# Patient Record
Sex: MALE | Race: WHITE | Employment: OTHER | URBAN - METROPOLITAN AREA
[De-identification: names, ages, dates, MRNs, and addresses within clinical notes are randomized per-mention and may not be internally consistent; named-entity substitution may affect disease eponyms.]

---

## 2017-11-01 ENCOUNTER — HOSPITAL ENCOUNTER (OUTPATIENT)
Dept: OTHER | Age: 82
Discharge: HOME | End: 2017-11-30
Attending: INTERNAL MEDICINE | Admitting: INTERNAL MEDICINE

## 2017-12-01 ENCOUNTER — HOSPITAL ENCOUNTER (OUTPATIENT)
Dept: OTHER | Age: 82
Discharge: HOME | End: 2017-12-31
Attending: INTERNAL MEDICINE | Admitting: INTERNAL MEDICINE

## 2018-01-01 ENCOUNTER — HOSPITAL ENCOUNTER (OUTPATIENT)
Dept: OTHER | Age: 83
Discharge: HOME | End: 2018-01-31
Attending: INTERNAL MEDICINE | Admitting: INTERNAL MEDICINE

## 2018-02-01 ENCOUNTER — HOSPITAL ENCOUNTER (OUTPATIENT)
Dept: OTHER | Age: 83
Discharge: HOME | End: 2018-02-28
Attending: INTERNAL MEDICINE | Admitting: INTERNAL MEDICINE

## 2018-03-01 ENCOUNTER — HOSPITAL ENCOUNTER (OUTPATIENT)
Dept: OTHER | Age: 83
Discharge: HOME | End: 2018-03-31
Attending: INTERNAL MEDICINE | Admitting: INTERNAL MEDICINE

## 2018-03-16 ENCOUNTER — HOSPITAL ENCOUNTER (OUTPATIENT)
Dept: CT IMAGING | Age: 83
Discharge: HOME | End: 2018-03-16
Attending: INTERNAL MEDICINE | Admitting: INTERNAL MEDICINE

## 2018-03-16 VITALS
WEIGHT: 162.26 LBS | RESPIRATION RATE: 16 BRPM | BODY MASS INDEX: 25.47 KG/M2 | OXYGEN SATURATION: 95 % | TEMPERATURE: 97.5 F | DIASTOLIC BLOOD PRESSURE: 63 MMHG | HEIGHT: 67 IN | HEART RATE: 85 BPM | SYSTOLIC BLOOD PRESSURE: 130 MMHG

## 2018-03-16 DIAGNOSIS — M35.9 NEUTROPENIA ASSOCIATED WITH AUTOIMMUNE DISEASE (HCC): ICD-10-CM

## 2018-03-16 DIAGNOSIS — D69.6 THROMBOCYTOPENIC DISORDER (HCC): ICD-10-CM

## 2018-03-16 DIAGNOSIS — D70.4 NEUTROPENIA ASSOCIATED WITH AUTOIMMUNE DISEASE (HCC): ICD-10-CM

## 2018-03-16 DIAGNOSIS — C91.Z0 OTHER LYMPHOID LEUKEMIA NOT HAVING ACHIEVED REMISSION (HCC): ICD-10-CM

## 2018-03-16 DIAGNOSIS — C91.Z0 LARGE GRANULAR LYMPHOCYTE DISORDER (HCC): ICD-10-CM

## 2018-03-16 LAB
IMMATURE RETIC FRACT: 0.53 (ref 0.21–0.37)
INR BLD: 1.05 (ref 0.85–1.15)
PROTHROMBIN TIME: 11.9 SEC (ref 9.6–13)
RETICULOCYTE ABSOLUTE COUNT: 0.11 M/UL
RETICULOCYTE COUNT PCT: 2.35 % (ref 0.5–2.18)

## 2018-03-16 RX ORDER — ACETAMINOPHEN 325 MG/1
650 TABLET ORAL EVERY 4 HOURS PRN
Status: DISCONTINUED | OUTPATIENT
Start: 2018-03-16 | End: 2018-03-17 | Stop reason: HOSPADM

## 2018-03-16 RX ORDER — MIDAZOLAM HYDROCHLORIDE 1 MG/ML
INJECTION INTRAMUSCULAR; INTRAVENOUS DAILY PRN
Status: COMPLETED | OUTPATIENT
Start: 2018-03-16 | End: 2018-03-16

## 2018-03-16 RX ORDER — FENTANYL CITRATE 50 UG/ML
INJECTION, SOLUTION INTRAMUSCULAR; INTRAVENOUS DAILY PRN
Status: COMPLETED | OUTPATIENT
Start: 2018-03-16 | End: 2018-03-16

## 2018-03-16 RX ADMIN — FENTANYL CITRATE 50 MCG: 50 INJECTION, SOLUTION INTRAMUSCULAR; INTRAVENOUS at 11:35

## 2018-03-16 RX ADMIN — MIDAZOLAM HYDROCHLORIDE 1 MG: 1 INJECTION INTRAMUSCULAR; INTRAVENOUS at 11:36

## 2018-03-16 ASSESSMENT — PAIN SCALES - GENERAL
PAINLEVEL_OUTOF10: 0

## 2018-03-16 ASSESSMENT — PAIN - FUNCTIONAL ASSESSMENT: PAIN_FUNCTIONAL_ASSESSMENT: 0-10

## 2018-03-16 NOTE — PROGRESS NOTES
Bx site low back without swelling or pain. Small sanquinous drainage on dressing.
pain level is established preoperatively using age appropriate pain scale.

## 2018-03-23 LAB
Lab: NORMAL
REPORT: NORMAL
THIS TEST SENT TO: NORMAL

## 2018-03-25 LAB
Lab: NORMAL
REPORT: NORMAL
THIS TEST SENT TO: NORMAL

## 2018-05-22 ENCOUNTER — HOSPITAL ENCOUNTER (OUTPATIENT)
Dept: OTHER | Age: 83
Discharge: HOME | End: 2018-05-31
Attending: INTERNAL MEDICINE | Admitting: INTERNAL MEDICINE

## 2018-05-29 ENCOUNTER — HOSPITAL ENCOUNTER (OUTPATIENT)
Dept: PHYSICAL THERAPY | Age: 83
Discharge: HOME | End: 2018-05-30
Admitting: INTERNAL MEDICINE

## 2018-05-29 NOTE — FLOWSHEET NOTE
plan (see comments)  [x] Plan of care initiated [] Hold pending MD visit [] Discharge    Plan for Next Session:  Add above as stated gradually as tolerated for overall strength and endurance and conditioning; progress to L2 Nustep and add leg press.       Electronically signed by:  Carloz Delgado [id number] [address]

## 2018-05-31 ENCOUNTER — HOSPITAL ENCOUNTER (OUTPATIENT)
Dept: PHYSICAL THERAPY | Age: 83
Discharge: HOME | End: 2018-06-30
Admitting: INTERNAL MEDICINE

## 2018-05-31 NOTE — FLOWSHEET NOTE
Resistance/Repetitions Other comments   Nustep seat 9 L2 x 5 min     Step HSS  GSS incline 3 x 20 sec each    Step tap (alternating)  Step up  6\" x 10  6\" x 10  R/L 1-2 finger touch    Gumdrop balance  SLB  Tandem balance 60 sec  30 sec R/L  30 sec R/L 1-2 finger touch   Fitter  F/B Thin x10 R/L    TB Row        Ext        tricep press  Red x15  Red x15  Red x15 5/31/18- Red TB issued for hep    Shoulder flex                 abd  Bicep curl 1# x10 B  1# x10 B  2# x10 B Inc reps   ll bars:  Reebok board  Bosu balance  Line walk   Lat walk with TB     x 30 sec each s/s     MH flex, abd  add   FAQ  HS curl 15# x 10 B  18# x 10 B     Leg press  40# x 10          UBE (last) X 4 min                      Other Therapeutic Activities:  Pt was educated on PT POC, Diagnosis, Prognosis, pathomechanics as well as frequency and duration of scheduling future physical therapy appointments. Time was also taken on this day to answer all patient questions and participation in PT. Reviewed appointment policy in detail with patient and patient verbalized understanding. Home Exercise Program:  Reviewed HEP from prior home PT:   Patient verbalized/demonstrated understanding and was issued written handout.     Timed Code Treatment Minutes:  40    Total Treatment Minutes: 40     Treatment/Activity Tolerance:  [x] Patient tolerated treatment well [] Patient limited by fatigue  [] Patient limited by pain  [] Patient limited by other medical complications  [x] Other: 5/31/18- issued hep for UE and TB     Prognosis: [x] Good [] Fair  [] Poor    Goals:    Short term goals  Time Frame for Short term goals: 2 wks  Short term goal 1: pt reports inc in endurance and strength by 25%   Short term goal 2: pt loreta 30 min of exercises without c/o       Long term goals  Time Frame for Long term goals : 4 wks  Long term goal 1: pt reports inc in endurance and strength by 50% for ease with social activities   Long term goal 2: B hip flex 5/5 for ease with amb loreta      Patient Requires Follow-up: [x] Yes  [] No    Plan:   [x] Continue per plan of care [] Alter current plan (see comments)  [] Plan of care initiated [] Hold pending MD visit [] Discharge    Plan for Next Session:  Add above as stated gradually as tolerated for overall strength and endurance and conditioning;  add MH.       Electronically signed by:  Toro Broderick, [id number] [address]

## 2018-06-01 ENCOUNTER — HOSPITAL ENCOUNTER (OUTPATIENT)
Dept: OTHER | Age: 83
Discharge: HOME | End: 2018-06-01
Attending: INTERNAL MEDICINE | Admitting: INTERNAL MEDICINE

## 2018-06-05 ENCOUNTER — HOSPITAL ENCOUNTER (OUTPATIENT)
Dept: PHYSICAL THERAPY | Age: 83
Discharge: HOME | End: 2018-06-06
Admitting: INTERNAL MEDICINE

## 2018-06-07 ENCOUNTER — HOSPITAL ENCOUNTER (OUTPATIENT)
Dept: PHYSICAL THERAPY | Age: 83
Discharge: HOME | End: 2018-06-08
Admitting: INTERNAL MEDICINE

## 2018-06-07 NOTE — PROGRESS NOTES
Outpatient Physical Therapy  [x] [facility]    Phone: [phone]   Fax: [phone]   [] [facility]  Phone: [phone]   Fax: [phone]  [] [facility]              Phone: [phone]   Fax: [phone]     Physical Therapy Progress/Discharge Note  Date: 2018        Patient Name:  Daisy Foster    :  1935  MRN: [id number]  Restrictions/Precautions: Position Activity Restriction  Other position/activity restrictions: low fall risk     Pertinent Medical History: Additional Pertinent Hx: leukemia; CAD; HTN; back surgeries; RA - occassional hip pain      Medical/Treatment Diagnosis Information:  · 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 Leeanna Melgoza of care signed (Y/N):   Y      Visit# / total visits:   - G code at 10   Pain level:      0/10      G-Code (if applicable):      Date G-Code Applied:          Time Period for Report:   18-18  Cancels/No-shows to date:  0    Plan of Care/Treatment to date:  [x] Therapeutic Exercise    [] Modalities:  [x] Therapeutic Activity     [] Ultrasound  [] Electrical Stimulation  [x] Gait Training      [] Cervical Traction    [] Lumbar Traction  [] Neuromuscular Re-education  [] Cold/hotpack [] Iontophoresis  [x] Instruction in HEP      Other:  [] Manual Therapy       [x]   Balance   [] Aquatic Therapy       []                    ?       Significant Findings At Last Visit/Comments:    Progress Note ( 18)  * overall 40% improvement noted per pt with endurance and activity level and strength overall  * pt loreta 45 min activity without c/o  * B hip flex 4+/5        Assessment:  Summary: pt tolerating progressing of strengthening exercises well   Patient's response to treatment:  good    Progress towards goals:  Progressing     Current Frequency/Duration:  # Days per week: [] 1 day # Weeks: [] 1 week [x] 4 weeks      [x] 2 days? [] 2 weeks [] 5 weeks      [] 3 days   [] 3 weeks [] 6 weeks     Rehab Potential: [] Excellent [x] Good [] Fair  [] Poor     Goal Status:  [] Achieved [x] Partially Achieved  [] Not Achieved     Patient Status: [] Continue per initial plan of Care     [] Patient now discharged     [x] Additional visits requested, Please re-certify for additional visits:      Requested frequency/duration: 2 X/week for 4weeks    Electronically signed by:  Morgan Jain, [id number]    If you have any questions or concerns, please don't hesitate to call.   Thank you for your referral.    Physician Signature:________________________________Date:__________________  By signing above, therapists plan is approved by physician

## 2018-06-12 ENCOUNTER — HOSPITAL ENCOUNTER (OUTPATIENT)
Dept: PHYSICAL THERAPY | Age: 83
Discharge: HOME | End: 2018-06-13
Admitting: INTERNAL MEDICINE

## 2018-06-14 ENCOUNTER — HOSPITAL ENCOUNTER (OUTPATIENT)
Dept: PHYSICAL THERAPY | Age: 83
Discharge: HOME | End: 2018-06-15
Admitting: INTERNAL MEDICINE

## 2018-06-14 NOTE — FLOWSHEET NOTE
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 Lexi Toro of care signed (Y/N):   Y     Visit# / total visits:   - G code at 10   Pain level: 0/10     G-Code (if applicable):      Date / Visit # G-Code Applied:  /  PT G-Codes  Functional Assessment Tool Used: LEFS  Score: 49/20-39%  Functional Limitation: Mobility: Walking and moving around  Mobility: Walking and Moving Around Current Status (): At least 20 percent but less than 40 percent impaired, limited or restricted  Mobility: Walking and Moving Around Goal Status (): At least 1 percent but less than 20 percent impaired, limited or restricted    Progress Note: [x]  Yes  []  No  Next due by: Visit #10      History of Injury: Subjective  Subjective: Pt has been dealing with low white blood cell count for years but the level dropped significantly in February. He was very lethargic and had no energy. His dx is large granular lymphocytic leukemia. It is not considered cancerous but he is currently taking a chemotherapy pill to see if that helps his numbers. He had home PT which has helped a lot and is ready to now move on and progress with strength and endurance and mobility. Subjective:  No pain now,still had a little mild soreness in low back at night and would still like to hold on leg press.   Pt also c/o mild R shoulder soreness at times. He saw oncologist and reports no significant change in white blood cell count numbers, so she stopped the oral chemotherapy x 2 months. She will reassess at that time and then he may receive some infusions from RA doctor if indicated to bring numbers up. Progress Note ( 6/7/18)  * overall 40% improvement noted per pt with endurance and activity level and strength overall  * pt loreta 45 min activity without c/o  * B hip flex 4+/5    Objective:  Observation:   Test measurements:       Exercises:  Exercise/Equipment Resistance/Repetitions Other comments   Nustep seat 9 L2 x 5 min     Step HSS  GSS incline 3 x 20 sec each    Step tap (alternating)  Step up  8\" x 10  8\" x 10  R/L 1-2 finger touch;     Gumdrop balance  SLB  Tandem balance 60 sec  30 sec R/L  30 sec R/L 1-2 finger touch   Fitter  F/B Thin x10 R/L Try S/S   TB Row        Ext        tricep press  Red x15  Red x15  Red x15 5/31/18- Red TB issued for hep    Shoulder flex                 abd  Bicep curl 1# x15 B  1# x15 B  3# x15 B    ll bars:  [employer] board  Bosu balance  Line walk/retro walk  Lat walk with TB     x 30 sec each s/s, f/b      X 2  Yellow x 4      Add     Mild R side back pain, took smaller steps    MH flex, abd  6/14 - hold to monitor how LBP is   FAQ  HS curl 15# x 15 B  18# x 15 B     Leg press   6/14 cont to hold per pt request due to LBP           UBE (last) seat #7 X 5 min            6/5 added SL abd to hep           Other Therapeutic Activities:  Pt was educated on PT POC, Diagnosis, Prognosis, pathomechanics as well as frequency and duration of scheduling future physical therapy appointments. Time was also taken on this day to answer all patient questions and participation in PT. Reviewed appointment policy in detail with patient and patient verbalized understanding. Home Exercise Program:  Reviewed HEP from prior home PT:   Patient verbalized/demonstrated understanding and was issued written handout.     Timed Code

## 2018-06-19 ENCOUNTER — HOSPITAL ENCOUNTER (OUTPATIENT)
Dept: PHYSICAL THERAPY | Age: 83
Discharge: HOME | End: 2018-06-20
Admitting: INTERNAL MEDICINE

## 2018-06-20 LAB
ANISOCYTOSIS: ABNORMAL
ATYPICAL LYMPHOCYTE RELATIVE PERCENT: 3 % (ref 0–6)
BANDED NEUTROPHILS RELATIVE PERCENT: 16 % (ref 0–7)
BASOPHILS ABSOLUTE: 0 K/UL (ref 0–0.2)
BASOPHILS RELATIVE PERCENT: 1 %
EOSINOPHILS ABSOLUTE: 0 K/UL (ref 0–0.6)
EOSINOPHILS RELATIVE PERCENT: 3 %
HCT VFR BLD CALC: 37.8 % (ref 40.5–52.5)
HEMATOLOGY PATH CONSULT: NO
HEMOGLOBIN: 12.7 G/DL (ref 13.5–17.5)
LYMPHOCYTES ABSOLUTE: 0.6 K/UL (ref 1–5.1)
LYMPHOCYTES RELATIVE PERCENT: 43 %
MCH RBC QN AUTO: 27 PG (ref 26–34)
MCHC RBC AUTO-ENTMCNC: 33.6 G/DL (ref 31–36)
MCV RBC AUTO: 80.6 FL (ref 80–100)
MONOCYTES ABSOLUTE: 0.2 K/UL (ref 0–1.3)
MONOCYTES RELATIVE PERCENT: 13 %
NEUTROPHILS ABSOLUTE: 0.5 K/UL (ref 1.7–7.7)
NEUTROPHILS RELATIVE PERCENT: 21 %
PDW BLD-RTO: 18.2 % (ref 12.4–15.4)
PLATELET # BLD: 61 K/UL (ref 135–450)
PLATELET SLIDE REVIEW: ABNORMAL
PMV BLD AUTO: 8.1 FL (ref 5–10.5)
POLYCHROMASIA: ABNORMAL
RBC # BLD: 4.69 M/UL (ref 4.2–5.9)
SLIDE REVIEW: ABNORMAL
WBC # BLD: 1.3 K/UL (ref 4–11)

## 2018-06-21 ENCOUNTER — HOSPITAL ENCOUNTER (OUTPATIENT)
Dept: PHYSICAL THERAPY | Age: 83
Discharge: HOME | End: 2018-06-22
Admitting: INTERNAL MEDICINE

## 2018-06-26 ENCOUNTER — HOSPITAL ENCOUNTER (OUTPATIENT)
Dept: PHYSICAL THERAPY | Age: 83
Discharge: HOME | End: 2018-06-27
Admitting: INTERNAL MEDICINE

## 2018-06-28 ENCOUNTER — HOSPITAL ENCOUNTER (OUTPATIENT)
Dept: PHYSICAL THERAPY | Age: 83
Discharge: HOME | End: 2018-06-29
Admitting: INTERNAL MEDICINE

## 2018-06-28 NOTE — FLOWSHEET NOTE
Poor    Goals:    Short term goals  Time Frame for Short term goals: 2 wks  Short term goal 1: pt reports inc in endurance and strength by 25%   Short term goal 2: pt loreta 30 min of exercises without c/o       Long term goals  Time Frame for Long term goals : 4 wks  Long term goal 1: pt reports inc in endurance and strength by 50% for ease with social activities   Long term goal 2: B hip flex 5/5 for ease with amb loreta      Patient Requires Follow-up: [x] Yes  [] No    Plan:   [x] Continue per plan of care [] Alter current plan (see comments)  [] Plan of care initiated [] Hold pending MD visit [] Discharge    Plan for Next Session:  Add above as stated gradually as tolerated for overall strength and endurance and conditioning;lunge; Monitor LBP and modify/hold on exercises if necessary     Electronically signed by:  Luigi Eason, [id number] [address]

## 2018-07-01 ENCOUNTER — HOSPITAL ENCOUNTER (OUTPATIENT)
Dept: OTHER | Age: 83
Discharge: HOME | End: 2018-07-23
Attending: INTERNAL MEDICINE | Admitting: INTERNAL MEDICINE

## 2018-07-06 ENCOUNTER — HOSPITAL ENCOUNTER (OUTPATIENT)
Dept: PHYSICAL THERAPY | Age: 83
Discharge: HOME | End: 2018-07-07
Admitting: INTERNAL MEDICINE

## 2018-07-06 NOTE — FLOWSHEET NOTE
and activity level and strength overall  * pt loreta 45 min activity without c/o  * B hip flex 4+/5    Objective:  Observation:   Test measurements:       Exercises:  Exercise/Equipment Resistance/Repetitions Other comments   Nustep seat 9 L2 x 5 min     Step HSS  GSS incline 3 x 20 sec each    Step tap (alternating)  Step up  8\" x 10  8\" x 10  R/L 1-2 finger touch;     Gumdrop balance  SLB  Tandem balance 60 sec  30 sec R/L  45 sec R/L 1-2 finger touch   Fitter  F/B abd only Thin x10 each R/L 6/19 - abd only with s/s due to some back pain with add   TB Row        Ext        tricep press  Red x 20  Red x 20  Red x 20 5/31/18- Red TB issued for hep    Shoulder flex                 abd  Bicep curl 1# x15 B  1# x15 B  3# x20 B    ll bars:  [employer]eb[employer] board  Bosu balance           Squat   Line walk/retro walk  Lat walk with TB   Partial lunge    x 30 sec each s/s, f/b   X 20 sec   X 10 with CGA   X 2  Red x 4               -Small steps to help with LBP  Add when able   MH flex, abd 10# x 10 each L/R   FAQ  HS curl 18# x 20 B  22# x 20 B     Leg press   6/14 cont to hold per pt request due to LBP           UBE (last) seat #7 X 5 min            6/5 added SL abd to hep           Other Therapeutic Activities:  Pt was educated on PT POC, Diagnosis, Prognosis, pathomechanics as well as frequency and duration of scheduling future physical therapy appointments. Time was also taken on this day to answer all patient questions and participation in PT. Reviewed appointment policy in detail with patient and patient verbalized understanding. Home Exercise Program:  Reviewed HEP from prior home PT:   Patient verbalized/demonstrated understanding and was issued written handout.     Timed Code Treatment Minutes:  45    Total Treatment Minutes: 45    Treatment/Activity Tolerance:  [x] Patient tolerated treatment well [] Patient limited by fatigue  [] Patient limited by pain  [] Patient limited by other medical complications  [] Other: Prognosis: [x] Good [] Fair  [] Poor    Goals:    Short term goals  Time Frame for Short term goals: 2 wks  Short term goal 1: pt reports inc in endurance and strength by 25%   Short term goal 2: pt loreta 30 min of exercises without c/o       Long term goals  Time Frame for Long term goals : 4 wks  Long term goal 1: pt reports inc in endurance and strength by 50% for ease with social activities   Long term goal 2: B hip flex 5/5 for ease with amb loreta      Patient Requires Follow-up: [x] Yes  [] No    Plan:   [x] Continue per plan of care [] Alter current plan (see comments)  [] Plan of care initiated [] Hold pending MD visit [] Discharge    Plan for Next Session:  Add above as stated gradually as tolerated for overall strength and endurance and conditioning;lunge; Monitor LBP and modify/hold on exercises if necessary     Electronically signed by:  Farida Nelson, [id number]

## 2018-07-10 ENCOUNTER — HOSPITAL ENCOUNTER (OUTPATIENT)
Dept: PHYSICAL THERAPY | Age: 83
Discharge: HOME | End: 2018-07-11
Admitting: INTERNAL MEDICINE

## 2018-07-10 NOTE — FLOWSHEET NOTE
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 John Milian of care signed (Y/N):   Y     Visit# / total visits:  15/16 - G code next time at d/c  Pain level: 0/10     G-Code (if applicable):      Date / Visit # G-Code Applied:  /  PT G-Codes  Functional Assessment Tool Used: LEFS  Score: 49/20-39%  Functional Limitation: Mobility: Walking and moving around  Mobility: Walking and Moving Around Current Status (): At least 20 percent but less than 40 percent impaired, limited or restricted  Mobility: Walking and Moving Around Goal Status (): At least 1 percent but less than 20 percent impaired, limited or restricted    Progress Note: [x]  Yes  []  No  Next due by: Visit #10      History of Injury: Subjective  Subjective: Pt has been dealing with low white blood cell count for years but the level dropped significantly in February. He was very lethargic and had no energy. His dx is large granular lymphocytic leukemia. It is not considered cancerous but he is currently taking a chemotherapy pill to see if that helps his numbers. He had home PT which has helped a lot and is ready to now move on and progress with strength and endurance and mobility. Subjective:  Had callus removed from foot. A little tender now, but it was making him walk differently and this should help.       Progress Note ( 7/10/18)  * overall 40% improvement noted per pt with endurance and activity level and strength overall  * Pt has since stopped oral chemotherapy also and that has helped too  * pt loreta 45 min activity without c/o  * B hip flex 4+/5    Objective:  Observation:   Test measurements:       Exercises:  Exercise/Equipment Resistance/Repetitions Other comments   Nustep seat 9 L2 x 5 min     Step HSS  GSS incline 3 x 20 sec each    Step tap (alternating)  Step up  8\" x 10  8\" x 10  R/L 1-2 finger touch;     Gumdrop balance  SLB  Tandem balance 60 sec  30 sec R/L  45 sec R/L 1-2 finger touch   Fitter  F/B abd only Thin x10 each R/L 6/19 - abd only with s/s due to some back pain with add   TB Row        Ext        tricep press  Red x 20  Red x 20  Red x 20 5/31/18- Red TB issued for hep    Shoulder flex                 abd  Bicep curl 1# x15 B  1# x15 B  3# x20 B    ll bars:  [employer] board  Bosu balance           Squat   Line walk/retro walk  Lat walk with TB     x 30 sec each s/s, f/b   X 20 sec   X 10 with CGA   X 2  Red x 4             -Small steps to help with LBP   MH flex, abd 10# x 15 each L/R   FAQ  HS curl 18# x 20 B  22# x 20 B     Leg press   6/14 cont to hold per pt request due to LBP           UBE (last) seat #7 X 5 min  Mild neck pain today after 7/10/18          6/5 added SL abd to hep           Other Therapeutic Activities:  Pt was educated on PT POC, Diagnosis, Prognosis, pathomechanics as well as frequency and duration of scheduling future physical therapy appointments. Time was also taken on this day to answer all patient questions and participation in PT. Reviewed appointment policy in detail with patient and patient verbalized understanding. Home Exercise Program:  Reviewed HEP from prior home PT:   Patient verbalized/demonstrated understanding and was issued written handout.     Timed Code Treatment Minutes:  45    Total Treatment Minutes: 45    Treatment/Activity Tolerance:  [x] Patient tolerated

## 2018-07-12 ENCOUNTER — HOSPITAL ENCOUNTER (OUTPATIENT)
Dept: PHYSICAL THERAPY | Age: 83
Discharge: HOME | End: 2018-07-13
Admitting: INTERNAL MEDICINE

## 2018-07-12 NOTE — FLOWSHEET NOTE
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 Lexi Toro of care signed (Y/N):   Y     Visit# / total visits:   - G code next time at d/c  Pain level: 0/10     G-Code (if applicable):      Date / Visit # G-Code Applied:  /  PT G-Codes  Functional Assessment Tool Used: LEFS  Score: 49/20-39%  Functional Limitation: Mobility: Walking and moving around  Mobility: Walking and Moving Around Current Status (): At least 20 percent but less than 40 percent impaired, limited or restricted  Mobility: Walking and Moving Around Goal Status (): At least 1 percent but less than 20 percent impaired, limited or restricted    Progress Note: [x]  Yes  []  No  Next due by: Visit #10      History of Injury: Subjective  Subjective: Pt has been dealing with low white blood cell count for years but the level dropped significantly in February. He was very lethargic and had no energy. His dx is large granular lymphocytic leukemia. It is not considered cancerous but he is currently taking a chemotherapy pill to see if that helps his numbers. He had home PT which has helped a lot and is ready to now move on and progress with strength and endurance and mobility. Subjective:  Ok today. No questions for d/c.     Progress Note ( 7/10/18)  * overall 40% improvement noted per pt with endurance and activity level and strength overall  * Pt has since stopped oral chemotherapy also and that has helped too  * pt loreta 45 min activity without c/o  * B hip flex 4+/5    Objective:  Observation:   Test measurements:       Exercises:  Exercise/Equipment Resistance/Repetitions Other comments   Nustep seat 9 L2 x 5 min     Step HSS  GSS incline 3 x 20 sec each    Step tap (alternating)  Step up  8\" x 10  8\" x 10  R/L 1-2 finger touch;     Gumdrop balance  SLB  Tandem balance 60 sec  30 sec R/L  45 sec R/L 1-2 finger touch   Fitter  F/B abd only Thin x10 each R/L 6/19 - abd only with s/s due to some back pain with add   TB Row        Ext        tricep press  Red x 20  Red x 20  Red x 20 5/31/18- Red TB issued for hep    Shoulder flex                 abd  Bicep curl 1# x15 B  1# x15 B  3# x20 B    ll bars:  [employer] board  Bosu balance           Squat   Line walk/retro walk  Lat walk with TB     x 30 sec each s/s, f/b   X 20 sec   X 10 with CGA   X 2  Red x 4             -Small steps to help with LBP   MH flex, abd 10# x 15 each L/R   FAQ  HS curl 18# x 20 B  22# x 20 B     Leg press   6/14 cont to hold per pt request due to LBP           UBE (last) seat #7 X 5 min  Mild neck pain today after 7/10/18          6/5 added SL abd to hep           Other Therapeutic Activities:  Pt was educated on PT POC, Diagnosis, Prognosis, pathomechanics as well as frequency and duration of scheduling future physical therapy appointments. Time was also taken on this day to answer all patient questions and participation in PT. Reviewed appointment policy in detail with patient and patient verbalized understanding. Home Exercise Program:  Reviewed HEP from prior home PT:   Patient verbalized/demonstrated understanding and was issued written handout.     Timed Code Treatment Minutes:  45    Total Treatment Minutes: 45    Treatment/Activity Tolerance:  [x] Patient tolerated treatment well [] Patient limited by fatigue  [] Patient limited by pain  [] Patient

## 2018-07-12 NOTE — DISCHARGE SUMMARY
Outpatient Physical Therapy  [x] [facility]    Phone: [phone]   Fax: [phone]   [] [facility]  Phone: [phone]   Fax: [phone]  [] [facility]              Phone: [phone]   Fax: [phone]     Physical Therapy Progress/Discharge Note  Date: 2018        Patient Name:  Gregorio Mendoza    :  1935  MRN: [id number]  Restrictions/Precautions: Position Activity Restriction  Other position/activity restrictions: low fall risk     Pertinent Medical History: Additional Pertinent Hx: leukemia; CAD; HTN; back surgeries; RA - occassional hip pain      Medical/Treatment Diagnosis Information:  · 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 Vasquez Bank of care signed (Y/N):   Y      Visit# / total visits:  - G code at 10   Pain level:      0/10      G-Code (if applicable):      Date G-Code Applied:          Time Period for Report:   18-18  Medicare: $ 1416.69  Cancels/No-shows to date:  0    Plan of Care/Treatment to date:  [x] Therapeutic Exercise    [] Modalities:  [x] Therapeutic Activity     [] Ultrasound  [] Electrical Stimulation  [x] Gait Training      [] Cervical Traction    [] Lumbar Traction  [] Neuromuscular Re-education  [] Cold/hotpack [] Iontophoresis  [x] Instruction in HEP      Other:  [] Manual Therapy       [x]   Balance   [] Aquatic Therapy       []                    ?       Significant Findings At Last Visit/Comments:     Progress Note ( 7/10/18)  * overall 40% improvement noted per pt with endurance and activity level and strength overall  * Pt has since stopped oral chemotherapy also and that has helped too  * pt loreta 45 min activity without c/o  * B hip flex 4+/5     Assessment:  Summary: pt tolerating progressing of strengthening exercises well

## 2019-01-10 ENCOUNTER — TELEPHONE (OUTPATIENT)
Dept: OTHER | Age: 84
End: 2019-01-10

## 2019-04-26 ENCOUNTER — HOSPITAL ENCOUNTER (OUTPATIENT)
Dept: PHYSICAL THERAPY | Age: 84
Setting detail: THERAPIES SERIES
Discharge: HOME | End: 2019-04-26
Payer: MEDICARE

## 2019-04-26 PROCEDURE — 97530 THERAPEUTIC ACTIVITIES: CPT | Performed by: CHIROPRACTOR

## 2019-04-26 PROCEDURE — 97161 PT EVAL LOW COMPLEX 20 MIN: CPT | Performed by: CHIROPRACTOR

## 2019-04-26 NOTE — FLOWSHEET NOTE
Physical Therapy Daily Treatment Note  Date:  2019    Patient Name:  Xavier Espinosa    :  1935  MRN: [id number]  Restrictions/Precautions:    Pertinent Medical History:  Medical/Treatment Diagnosis Information:  · Diagnosis: B Trochanteric Bursitis  ·    Insurance/Certification information:    Medicare  Physician Information:      Dr. Darby Jackson of care signed (Y/N):   Faxed  Visit# / total visits:   Pain level: 5-6/10     G-Code (if applicable):      Date / Visit # G-Code Applied:  /  PT G-Codes  Functional Assessment Tool Used: (LEFS)  Score: 53    Progress Note: []  Yes  []  No  Next due by: Visit #10      History of Injury:  States pain began ~ 5 months ago for no apparent reason)    Subjective:   C/o pain on lateral aspects  B hips and sometimes in LB  (At worst when lying on either side)    Objective:  Observation:   Test measurements:      Exercises:  Exercise/Equipment Resistance/Repetitions Other comments        Reviewed home exer, posture and body mechanics          Nu-step       #8 X 5 min         GSS/HSS 30 sec x 2    ITB stretch  (stand) 30 sec x 2         Rocker board          Seated reclines 1x10    LTR 1x10        R/L    Bridges 1x10                   MH   (sit) LB/ L ITB   X 15 min                Other Therapeutic Activities:      Home Exercise Program:   Voiced understanding of home exer    Manual Treatments:      Modalities:  MH    Timed Code Treatment Minutes:  30    Total Treatment Minutes:  60    Assessment  [x] Patient tolerated treatment well [] Patient limited by fatigue  [] Patient limited by pain  [] Patient limited by other medical complications  [] Other:         Prognosis: [x] Good [] Fair  [] Poor    Patient Requires Follow-up: [] Yes  [] No    Plan:   [x] Continue per plan of care [] Alter current plan (see comments)  [] Plan of care initiated [] Hold pending MD visit [] Discharge  Plan for Next Session:  Increase exer as above    Electronically signed by:

## 2019-04-26 NOTE — PROGRESS NOTES
Physical Therapy  Initial Assessment  Date: 2019  Patient Name: Jose F Turner  MRN: [id number]  : 1935          Restrictions  Restrictions/Precautions  Restrictions/Precautions: Fall Risk(No fall risk)    Subjective   General  Chart Reviewed: Yes  Patient assessed for rehabilitation services?: Yes  Additional Pertinent Hx: Arthritis, lumbar surgery 5 yrs ago, CAD, HTN, Neuropathy  Family / Caregiver Present: No  Referring Practitioner: Dr. Micheal Cabezas  Referral Date : 19  Diagnosis: B Trochanteric Bursitis  PT Visit Information  Onset Date: (States pain began ~ 5 months ago for no apparent reason)  PT Insurance Information: Medicare  Total # of Visits Approved: 36  Total # of Visits to Date: 1  Subjective  Subjective: C/o in both hips and sometimes in LB  Pain Screening  Patient Currently in Pain: (Up to 5/6/10  (Worst when lying on either side))         Objective          PROM RLE (degrees)  RLE PROM: WNL(Increased pain with ITB stretching)  PROM LLE (degrees)  LLE PROM: WNL(Increased pain with ITB stretch)    Strength LLE  Strength LLE: WNL  Strength Other  Other: Trunk: Flex/Ext 4/5     Additional Measures  Flexibility: (HS flexibility: 45 degrees B)  Sensation  Overall Sensation Status: WFL(Decreased in both feet)             Ambulation  Ambulation?: (Amb without any assistive device)                            Assessment   Conditions Requiring Skilled Therapeutic Intervention  Body structures, Functions, Activity limitations: Decreased ROM  Assessment: Prior level of function: Able to complete all usual ADLs without increased pain.  Able to sleep on his side  Prognosis: Good  REQUIRES PT FOLLOW UP: Yes  Activity Tolerance  Activity Tolerance: Patient Tolerated treatment well         Plan   Plan  Times per week: 3x/wk x 12 weeks  Current Treatment Recommendations: ROM, Balance Training, Home Exercise Program, Modalities    G-Code  PT G-Codes  Functional Assessment Tool Used: (LEFS)  Score: 53    OutComes Score  LEFS Total Score: 53                                                        Goals  Short term goals  Time Frame for Short term goals: 6 weeks  Short term goal 1: Be independent with home exer  Short term goal 2: Decrease pain 25-50%  Long term goals  Time Frame for Long term goals : 12 weeks  Long term goal 1: Decrease pain 50-75%  Long term goal 2: Improve flexibility to be able to maintain good posture and good body mechanics  Patient Goals   Patient goals : \"\"Sleep on either side\"        Dinorah Rodas #[id number]

## 2019-04-26 NOTE — PLAN OF CARE
Outpatient Physical Therapy  [x] [facility]    Phone: [phone]   Fax: [phone]   [] [facility]  Phone: [phone]              Fax: [phone]  [] [facility]   Phone: [phone]   Fax: [phone]     To: Referring Practitioner: Dr. Denise Posada      Patient: Valentina Tarango   : 1935   MRN: [id number]  Evaluation Date: 2019      Diagnosis Information:  · Diagnosis: B Trochanteric Bursitis   ·       Physical Therapy Certification/Re-Certification Form  Dear Saintclair Alken  The following patient has been evaluated for physical therapy services and for therapy to continue, Medicare requires monthly physician review of the treatment plan. Please review the attached evaluation and/or summary of the patient's plan of care, and verify that you agree therapy should continue by signing the attached document and sending it back to our office. Plan of Care/Treatment to date:  [x] Therapeutic Exercise    [] Modalities:  [x] Therapeutic Activity     [] Ultrasound  [] Electrical Stimulation  [] Gait Training      [] Cervical Traction [] Lumbar Traction  [] Neuromuscular Re-education    [] Cold/hotpack [] Iontophoresis   [x] Instruction in HEP     Other:  [x] Manual Therapy      []             [] Aquatic Therapy      []           ? Frequency/Duration:  # Days per week: [] 1 day # Weeks: [] 1 week [] 5 weeks     [] 2 days? [] 2 weeks [] 6 weeks     [x] 3 days   [] 3 weeks [] 7 weeks     [] 4 days   [] 4 weeks [x] 12 weeks    Rehab Potential: [] Excellent [x] Good [] Fair  [] Poor       Electronically signed by:  Mary Grimes CD#[id number]      If you have any questions or concerns, please don't hesitate to call.   Thank you for your referral.      Physician Signature:________________________________Date:__________________  By signing above, therapists plan is approved by physician

## 2019-04-29 ENCOUNTER — HOSPITAL ENCOUNTER (OUTPATIENT)
Dept: PHYSICAL THERAPY | Age: 84
Setting detail: THERAPIES SERIES
Discharge: HOME | End: 2019-04-29
Payer: MEDICARE

## 2019-04-29 PROCEDURE — 97110 THERAPEUTIC EXERCISES: CPT

## 2019-04-29 NOTE — FLOWSHEET NOTE
Physical Therapy Daily Treatment Note  Date:  2019    Patient Name:  Hellen Rhodes    :  1935  MRN: [id number]  Restrictions/Precautions:    Pertinent Medical History: CKD, LB Sx x2  Medical/Treatment Diagnosis Information:  · Diagnosis: B Trochanteric Bursitis     Insurance/Certification information:    Medicare  Physician Information:      Dr. Inge Corbett of care signed (Y/N): Faxed    Visit# / total visits:   Pain level: 1 /10 B hips     G-Code (if applicable):      Date / Visit # G-Code Applied:  /  PT G-Codes  Functional Assessment Tool Used: (LEFS)  Score: 53    Progress Note: []  Yes  [x]  No  Next due by: Visit #10      History of Injury:  States pain began ~ 5 months ago for no apparent reason)    Subjective:    only mild discomfort today B lateral hips. Increases when lie on side and prolonged walking > 5 min. Objective:  Observation:   Test measurements:      Exercises:  Exercise/Equipment Resistance/Repetitions Other comments        Nu-step       #8 L-0 X 5 min         GSS/HSS 30 sec x 2 R/L    ITB stretch  (stand) 30 sec x 2 R/L         Rocker board F/B, S/S  30 sec ea  SBA    Standing hip abd add                   Seated reclines 1x10    LTR 1x10           Bridges 1x10                   MH   (sit) LB/ L ITB   X 15 min Heat feels good     Other Therapeutic Activities:      Home Exercise Program:   Voiced understanding of home exer    Manual Treatments:      Modalities:  MH    Timed Code Treatment Minutes:  30    Total Treatment Minutes:      Assessment  [x] Patient tolerated treatment well [] Patient limited by fatigue  [] Patient limited by pain  [] Patient limited by other medical complications  [x] Other: instructed in use of heat at home.          Prognosis: [x] Good [] Fair  [] Poor    Patient Requires Follow-up: [] Yes  [] No    Plan:   [x] Continue per plan of care [] Alter current plan (see comments)  [] Plan of care initiated [] Hold pending MD visit [] Discharge    Plan for Next Session:  Increase exer as above    Electronically signed by:  Lacey Sierra, PTA  [id number]

## 2019-05-01 ENCOUNTER — HOSPITAL ENCOUNTER (OUTPATIENT)
Dept: PHYSICAL THERAPY | Age: 84
Setting detail: THERAPIES SERIES
Discharge: HOME | End: 2019-05-01
Payer: MEDICARE

## 2019-05-01 PROCEDURE — 97110 THERAPEUTIC EXERCISES: CPT

## 2019-05-03 ENCOUNTER — HOSPITAL ENCOUNTER (OUTPATIENT)
Dept: PHYSICAL THERAPY | Age: 84
Setting detail: THERAPIES SERIES
Discharge: HOME | End: 2019-05-03
Payer: MEDICARE

## 2019-05-03 PROCEDURE — 97110 THERAPEUTIC EXERCISES: CPT

## 2019-05-03 NOTE — FLOWSHEET NOTE
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 Gómez Darby of care signed (Y/N): Faxed    Visit# / total visits:  - check with pt to see if he found old Aquatic HEP booklet, if not issue new one. He will have access to a pool for a few weeks in [place] ( leaving )    Pain level: 0 /10 B hips     G-Code (if applicable):      Date / Visit # G-Code Applied:  /  PT G-Codes  Functional Assessment Tool Used: (LEFS)  Score: 53    Progress Note: []  Yes  [x]  No  Next due by: Visit #10      History of Injury:  States pain began ~ 5 months ago for no apparent reason)    Subjective:   Pt reports hips only really bother him when he is laying down trying to sleep.      Objective:  Observation:   Test measurements:      Exercises:  Exercise/Equipment Resistance/Repetitions Other comments        Nu-step       #8 L-0 X 5 min         GSS/HSS 30 sec x 2 R/L HSS first step, due to difficulty with knee ext on 2nd step   ITB stretch  (stand) 30 sec x 2 R/L         Rocker board F/B, S/S  30 sec ea  SBA    Standing hip abd 0# x 10 R/L                   Seated reclines 1x10    LTR 1x10           Bridges 2x10    Piriformis stretch 5 x 10 sec     H-L T-band abd Add if loreta          MH   (sit) LB/ L ITB   X 15 min Heat feels good     Other Therapeutic Activities:  Issued hep booklet today 5/3/19    Home Exercise Program:   Voiced understanding of home exer    Manual Treatments:      Modalities:  MH    Timed Code Treatment Minutes:  30    Total Treatment Minutes:  45    Assessment  [x] Patient tolerated treatment well [] Patient limited by fatigue  [] Patient limited by pain  [] Patient limited by other medical

## 2019-05-06 ENCOUNTER — HOSPITAL ENCOUNTER (OUTPATIENT)
Dept: PHYSICAL THERAPY | Age: 84
Setting detail: THERAPIES SERIES
Discharge: HOME | End: 2019-05-06
Payer: MEDICARE

## 2019-05-06 NOTE — FLOWSHEET NOTE
Physical Therapy  Cancellation/No-show Note  Patient Name:  Hellen Rhodes  :  1935   Date:  2019  Cancelled visits to date: 1  No-shows to date: 0    For today's appointment patient:  [x]  Cancelled  []  Rescheduled appointment  []  No-show     Reason given by patient:  []  Patient ill  []  Conflicting appointment  []  No transportation    []  Conflict with work  []  No reason given  []  Other:     Comments:      Electronically signed by:  Luh Dela Cruz #[id number]

## 2019-05-20 ENCOUNTER — HOSPITAL ENCOUNTER (OUTPATIENT)
Dept: PHYSICAL THERAPY | Age: 84
Setting detail: THERAPIES SERIES
Discharge: HOME | End: 2019-05-20
Payer: MEDICARE

## 2019-05-20 PROCEDURE — 97110 THERAPEUTIC EXERCISES: CPT | Performed by: CHIROPRACTOR

## 2019-05-20 NOTE — FLOWSHEET NOTE
plan of care [] Alter current plan (see comments)  [] Plan of care initiated [] Hold pending MD visit [] Discharge    Plan for Next Session:  Increase exer as above.       Electronically signed by:  Krystyna YATES#[id number]

## 2019-05-22 ENCOUNTER — HOSPITAL ENCOUNTER (OUTPATIENT)
Dept: PHYSICAL THERAPY | Age: 84
Setting detail: THERAPIES SERIES
Discharge: HOME | End: 2019-05-22
Payer: MEDICARE

## 2019-05-22 PROCEDURE — 97110 THERAPEUTIC EXERCISES: CPT

## 2019-05-22 NOTE — FLOWSHEET NOTE
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 Murtaza Cosby of care signed (Y/N): Faxed    Visit# / total visits:      Pain level: 0 /10 B hips     G-Code (if applicable):      Date / Visit # G-Code Applied:  /  PT G-Codes  Functional Assessment Tool Used: (LEFS)  Score: 53    Progress Note: []  Yes  [x]  No  Next due by: Visit #10      History of Injury:  States pain began ~ 5 months ago for no apparent reason)    Subjective:   No pain this am,     Objective:  Observation:   Test measurements:      Exercises:  Exercise/Equipment Resistance/Repetitions Other comments        Nu-step       #8 L-1 X 5 min         GSS/HSS 30 sec x 2 R/L 2nd step today   ITB stretch  (stand) 30 sec x 2 R/L         Rocker board F/B, S/S  30 sec ea  SBA    Standing hip abd 1# - 2x10 R/L                   Seated reclines 1x10    LTR 1x10           Bridges 2x10    Piriformis stretch 5 x 10 sec  R/L    Hklg  T-band abd Green - 3x10         MH   (sit) LB/ L ITB   X 15 min Heat feels good     Other Therapeutic Activities:  Issued hep booklet today 5/3/19    Home Exercise Program:   Voiced understanding of home exer    Manual Treatments:      Modalities:  MH    Timed Code Treatment Minutes:  30    Total Treatment Minutes:  45    Assessment  [x] Patient tolerated treatment well [] Patient limited by fatigue  [] Patient limited by pain  [] Patient limited by other medical complications  [x] Other: instructed in use of heat at home.          Prognosis: [x] Good [] Fair  [] Poor    Patient Requires Follow-up: [x] Yes  [] No    Plan:   [x] Continue per plan of care [] Alter current plan (see comments)  [] Plan of care initiated [] Hold pending MD visit [] Discharge    Plan for Next Session:  Increase exer as above.       Electronically signed by:  Maritza Mancini,

## 2019-05-24 ENCOUNTER — HOSPITAL ENCOUNTER (OUTPATIENT)
Dept: PHYSICAL THERAPY | Age: 84
Setting detail: THERAPIES SERIES
Discharge: HOME | End: 2019-05-24
Payer: MEDICARE

## 2019-05-24 PROCEDURE — 97110 THERAPEUTIC EXERCISES: CPT

## 2019-05-24 NOTE — FLOWSHEET NOTE
Physical Therapy Daily Treatment Note  Date:  2019    Patient Name:  Nima Park    :  1935  MRN: [id number]  Restrictions/Precautions:    Pertinent Medical History: CKD, LB Sx x2  Medical/Treatment Diagnosis Information:  · Diagnosis: B Trochanteric Bursitis     Insurance/Certification information:    Medicare  Physician Information:      Dr. Jl Rivas of care signed (Y/N): Faxed    Visit# / total visits:      Pain level: 0 /10 B hips     G-Code (if applicable):      Date / Visit # G-Code Applied:  /  PT G-Codes  Functional Assessment Tool Used: (LEFS)  Score: 53    Progress Note: []  Yes  [x]  No  Next due by: Visit #10      History of Injury:  States pain began ~ 5 months ago for no apparent reason)    Subjective:   No pain at present. Is fighting a sinus infection and is on new medication that he thinks may be helping. Still waking up at night with pain if he's laying on his side, stephanie the L side    Objective:  Observation:   Test measurements:      Exercises:  Exercise/Equipment Resistance/Repetitions Other comments        Nu-step       #8 L-1 X 5 min         GSS/HSS 30 sec x 2 R/L 2nd step today   ITB stretch  (stand) 30 sec x 2 R/L         Rocker board F/B, S/S  30 sec ea  SBA    Standing hip abd 1# - 2x10 R/L                   Seated reclines 1x10    LTR 1x10           Bridges 2x10    Piriformis stretch 5 x 10 sec  R/L    Hklg  T-band abd Green - 3x10         MH   (sit) LB/ L ITB   X 15 min Heat feels good     Other Therapeutic Activities:  Issued hep booklet today 5/3/19    Home Exercise Program:   Voiced understanding of home exer    Manual Treatments:      Modalities:      Timed Code Treatment Minutes:  30    Total Treatment Minutes:  45    Assessment  [x] Patient tolerated treatment well [] Patient limited by fatigue  [] Patient limited by pain  [] Patient limited by other medical complications  [x] Other: instructed in use of heat at home.          Prognosis: [x] Good [] Fair  [] Poor    Patient Requires Follow-up: [x] Yes  [] No    Plan:   [x] Continue per plan of care [] Alter current plan (see comments)  [] Plan of care initiated [] Hold pending MD visit [] Discharge    Plan for Next Session:  Increase exer as above.       Electronically signed by:  Emilia Albert, PTA [id number]

## 2019-05-28 ENCOUNTER — HOSPITAL ENCOUNTER (OUTPATIENT)
Dept: PHYSICAL THERAPY | Age: 84
Setting detail: THERAPIES SERIES
Discharge: HOME | End: 2019-05-28
Payer: MEDICARE

## 2019-05-28 PROCEDURE — 97110 THERAPEUTIC EXERCISES: CPT | Performed by: CHIROPRACTOR

## 2019-05-28 NOTE — FLOWSHEET NOTE
Physical Therapy Daily Treatment Note  Date:  2019    Patient Name:  Missael Mason    :  1935  MRN: [id number]  Restrictions/Precautions:    Pertinent Medical History: CKD, LB Sx x2  Medical/Treatment Diagnosis Information:  · Diagnosis: B Trochanteric Bursitis     Insurance/Certification information:    Medicare  Physician Information:      Dr. Sera Grissom of care signed (Y/N): Faxed    Visit# / total visits:      Pain level: 0/10 B hips     G-Code (if applicable):      Date / Visit # G-Code Applied:  /  PT G-Codes  Functional Assessment Tool Used: (LEFS)  Score: 53    Progress Note: []  Yes  [x]  No  Next due by: Visit #10      History of Injury:  States pain began ~ 5 months ago for no apparent reason)    Subjective:   No pain at present. Is fighting a sinus infection and is on new medication that he thinks may be helping.  Still waking up at night with pain if he's laying on his side, stephanie the L side    Objective:  Observation:   Test measurements:      Exercises:  Exercise/Equipment Resistance/Repetitions Other comments        Nu-step       #8 L-1 X 5 min         GSS/HSS 30 sec x 2 R/L 2nd step today   ITB stretch  (stand) 30 sec x 2 R/L         Rocker board F/B, S/S  30 sec ea  SBA    Standing hip abd 1# - 3x10 R/L                   Seated reclines 1x15    LTR 1x15           Bridges 2x10    Piriformis stretch 5 x 10 sec  R/L    Hklg  T-band abd Green - 3x10         MH   (sit) LB/ L ITB   X 15 min Heat feels good     Other Therapeutic Activities:  Issued hep booklet today 5/3/19    Home Exercise Program:   Voiced understanding of home exer    Manual Treatments:      Modalities:  MH    Timed Code Treatment Minutes:  30    Total Treatment Minutes:  45    Assessment  [x] Patient tolerated treatment well [] Patient limited by fatigue  [] Patient limited by pain  [] Patient limited by other medical complications  [] Other:        Prognosis: [x] Good [] Fair  [] Poor    Patient Requires

## 2019-05-29 ENCOUNTER — HOSPITAL ENCOUNTER (OUTPATIENT)
Dept: PHYSICAL THERAPY | Age: 84
Setting detail: THERAPIES SERIES
Discharge: HOME | End: 2019-05-29
Payer: MEDICARE

## 2019-05-29 PROCEDURE — 97035 APP MDLTY 1+ULTRASOUND EA 15: CPT

## 2019-05-29 PROCEDURE — 97110 THERAPEUTIC EXERCISES: CPT

## 2019-05-29 NOTE — FLOWSHEET NOTE
Physical Therapy Daily Treatment Note  Date:  2019    Patient Name:  Bang Pittman    :  1935  MRN: [id number]  Restrictions/Precautions:    Pertinent Medical History: CKD, LB Sx x2  Medical/Treatment Diagnosis Information:  · Diagnosis: B Trochanteric Bursitis     Insurance/Certification information:    Medicare  Physician Information:      Dr. Elvira Mcclure of care signed (Y/N): Faxed    Visit# / total visits:      Pain level: 0/10 B hips     G-Code (if applicable):      Date / Visit # G-Code Applied:  /  PT G-Codes  Functional Assessment Tool Used: (LEFS)  Score: 53    Progress Note: []  Yes  [x]  No  Next due by: Visit #10      History of Injury:  States pain began ~ 5 months ago for no apparent reason)    Subjective:   No pain at present. Increased pain when lying on hips for prolonged time L>R, difficulty sleeping on sides.  .     Objective:  Observation:   Test measurements:      Exercises:  Exercise/Equipment Resistance/Repetitions Other comments        Nu-step       #8 L-1 X 5 min         GSS/HSS 30 sec x 2 R/L 2nd step today   ITB stretch  (stand) 30 sec x 2 R/L         Rocker board F/B, S/S  30 sec ea  SBA    Standing hip abd 1# - 3x10 R/L                   Seated reclines 1x15    LTR 1x15           Bridges 2x10    Piriformis stretch 5 x 10 sec  R/L    Hklg  T-band abd Green - 3x10                   US    50%    1.5 w/cm2 8 min  L gr trochanter Added per PT        MH   (sit) LB/ L ITB   X 15 min Heat feels good     Other Therapeutic Activities:  Issued hep booklet today 5/3/19    Home Exercise Program:   Voiced understanding of home exer    Manual Treatments:      Modalities:      Timed Code Treatment Minutes:  40    Total Treatment Minutes:  55    Assessment  [x] Patient tolerated treatment well [] Patient limited by fatigue  [] Patient limited by pain  [] Patient limited by other medical complications  [x] Other:   TTP L lateral hip, tight B HSS      Prognosis: [x] Good [] Fair  [] Poor    Patient Requires Follow-up: [x] Yes  [] No    Plan:   [x] Continue per plan of care [] Alter current plan (see comments)  [] Plan of care initiated [] Hold pending MD visit [] Discharge    Plan for Next Session:  Assess US      Electronically signed by:  Austin Blair,

## 2019-05-31 ENCOUNTER — HOSPITAL ENCOUNTER (OUTPATIENT)
Dept: PHYSICAL THERAPY | Age: 84
Setting detail: THERAPIES SERIES
Discharge: HOME | End: 2019-05-31
Payer: MEDICARE

## 2019-05-31 PROCEDURE — 97110 THERAPEUTIC EXERCISES: CPT | Performed by: CHIROPRACTOR

## 2019-05-31 NOTE — FLOWSHEET NOTE
Physical Therapy Daily Treatment Note  Date:  2019    Patient Name:  Kamaljit Díaz    :  1935  MRN: [id number]  Restrictions/Precautions:    Pertinent Medical History: CKD, LB Sx x2  Medical/Treatment Diagnosis Information:  · Diagnosis: B Trochanteric Bursitis     Insurance/Certification information:    Medicare  Physician Information:      Dr. Jovi Aldana of care signed (Y/N): Faxed    Visit# / total visits: 10/36     Pain level: 0/10 B hips     G-Code (if applicable):      Date / Visit # G-Code Applied:  /  PT G-Codes  Functional Assessment Tool Used: (LEFS)  Score: 53    Progress Note: []  Yes  [x]  No  Next due by: Visit #10      History of Injury:  States pain began ~ 5 months ago for no apparent reason)    Subjective:   No pain at present. Increased pain when lying on hips for prolonged time L>R, difficulty sleeping on sides.  .     Objective:  Observation:   Test measurements:      Exercises:  Exercise/Equipment Resistance/Repetitions Other comments        Nu-step       #8 L-1 X 5 min         GSS/HSS 30 sec x 2 R/L 2nd step today   ITB stretch  (stand) 30 sec x 2 R/L         Rocker board F/B, S/S  45 sec ea  SBA    Standing hip abd 2# - 3x10 R/L    B gumdrops balance X 45 sec              Seated reclines 1x15    LTR 1x15           Bridges 2x10    Piriformis stretch 5 x 10 sec  R/L    Hklg  T-band abd Green - 3x10                   US    50%    1.5 w/cm2  Pt did not notice any benefit        MH   (sit) LB/ L ITB   X 15 min Heat feels good     Other Therapeutic Activities:  Issued hep booklet today 5/3/19    Home Exercise Program:   Voiced understanding of home exer    Manual Treatments:      Modalities:  MH    Timed Code Treatment Minutes:  30    Total Treatment Minutes:  45    Assessment  [x] Patient tolerated treatment well [] Patient limited by fatigue  [] Patient limited by pain  [] Patient limited by other medical complications  [x] Other:   TTP L lateral hip, tight B

## 2019-06-03 ENCOUNTER — HOSPITAL ENCOUNTER (OUTPATIENT)
Dept: PHYSICAL THERAPY | Age: 84
Setting detail: THERAPIES SERIES
Discharge: HOME | End: 2019-06-03
Payer: MEDICARE

## 2019-06-03 NOTE — FLOWSHEET NOTE
Physical Therapy  Cancellation/No-show Note  Patient Name:  Teresa Lang  :  1935   Date:  6/3/2019  Cancelled visits to date: 2  No-shows to date: 0    For today's appointment patient:  [x]  Cancelled  []  Rescheduled appointment  []  No-show     Reason given by patient:  [x]  Patient ill  []  Conflicting appointment  []  No transportation    []  Conflict with work  []  No reason given  []  Other:     Comments:  Intestinal issues    Electronically signed by:  Piper Niño,

## 2019-06-05 ENCOUNTER — HOSPITAL ENCOUNTER (OUTPATIENT)
Dept: PHYSICAL THERAPY | Age: 84
Setting detail: THERAPIES SERIES
Discharge: HOME | End: 2019-06-05
Payer: MEDICARE

## 2019-06-05 NOTE — FLOWSHEET NOTE
Physical Therapy Daily Treatment Note  Date:  2019    Patient Name:  Nima Park    :  1935  MRN: [id number]  Restrictions/Precautions:    Pertinent Medical History: CKD, LB Sx x2  Medical/Treatment Diagnosis Information:  · Diagnosis: B Trochanteric Bursitis     Insurance/Certification information:    Medicare  Physician Information:      Dr. Jl Rivas of care signed (Y/N): Faxed    Visit# / total visits:      Pain level: 0/10 B hips     G-Code (if applicable):      Date / Visit # G-Code Applied:  /  PT G-Codes  Functional Assessment Tool Used: (LEFS)  Score: 53    Progress Note: []  Yes  [x]  No  Next due by: Visit #10      History of Injury:  States pain began ~ 5 months ago for no apparent reason)    Subjective:   No pain at present. Sleeping on sides a little better.      Objective:  Observation:   Test measurements:      Exercises:  Exercise/Equipment Resistance/Repetitions Other comments        Nu-step       #8 L-1 X 5 min         GSS/HSS 30 sec x 2 R/L 2nd step today   ITB stretch  (stand) 30 sec x 2 R/L         Rocker board F/B, S/S  45 sec ea  SBA    Standing hip abd 2# - 3x10 R/L    B gumdrops balance X 45 sec              Seated reclines 1x15    LTR 1x15           Bridges 3x10    Piriformis stretch 3 x 20 sec  R/L    Hklg  T-band abd Green - 3x10                   US    50%    1.5 w/cm2  Pt did not notice any benefit        MH   (sit) LB/ L ITB   X 15 min Heat feels good     Other Therapeutic Activities:  Issued hep booklet today 5/3/19    Home Exercise Program:   Voiced understanding of home exer    Manual Treatments:      Modalities:      Timed Code Treatment Minutes:  35    Total Treatment Minutes:  50    Assessment  [x] Patient tolerated treatment well [] Patient limited by fatigue  [] Patient limited by pain  [] Patient limited by other medical complications  [x] Other:        Prognosis: [x] Good [] Fair  [] Poor    Patient Requires Follow-up: [x] Yes  [] No    Plan:   [x] Continue per plan of care [] Alter current plan (see comments)  [] Plan of care initiated [] Hold pending MD visit [] Discharge    Plan for Next Session:        Electronically signed by:  Anell Schwab,

## 2019-06-07 ENCOUNTER — HOSPITAL ENCOUNTER (OUTPATIENT)
Dept: PHYSICAL THERAPY | Age: 84
Setting detail: THERAPIES SERIES
Discharge: HOME | End: 2019-06-07
Payer: MEDICARE

## 2019-06-07 PROCEDURE — 97110 THERAPEUTIC EXERCISES: CPT | Performed by: CHIROPRACTOR

## 2019-06-07 NOTE — FLOWSHEET NOTE
Physical Therapy Daily Treatment Note  Date:  2019    Patient Name:  Janna Aldana    :  1935  MRN: [id number]  Restrictions/Precautions:    Pertinent Medical History: CKD, LB Sx x2  Medical/Treatment Diagnosis Information:  · Diagnosis: B Trochanteric Bursitis     Insurance/Certification information:    Medicare  Physician Information:      Dr. Nusrat Ospina of care signed (Y/N): Faxed    Visit# / total visits:      Pain level: 0/10 B hips     G-Code (if applicable):      Date / Visit # G-Code Applied:  /  PT G-Codes  Functional Assessment Tool Used: (LEFS)  Score: 53    Progress Note: []  Yes  [x]  No  Next due by: Visit #10      History of Injury:  States pain began ~ 5 months ago for no apparent reason)    Subjective:   No pain at present. Sleeping on sides a little better.      Objective:  Observation:   Test measurements:      Exercises:  Exercise/Equipment Resistance/Repetitions Other comments        Nu-step       #8 L-1 X 5 min         GSS/HSS 30 sec x 2 R/L 2nd step today   ITB stretch  (stand) 30 sec x 2 R/L         Rocker board F/B, S/S  45 sec ea  SBA    Standing hip abd 3# - 3x10 R/L    B gumdrops balance X 45 sec              Seated reclines 2x10    SLR 0# - 1x10     R/L    LTR 1x15           Bridges 3x10    Piriformis stretch 3 x 20 sec  R/L    Hklg  T-band abd Green - 3x10                   US    50%    1.5 w/cm2  Pt did not notice any benefit        MH   (sit) LB/ L ITB   X 15 min Heat feels good     Other Therapeutic Activities:  Issued hep booklet today 5/3/19    Home Exercise Program:   Voiced understanding of home exer    Manual Treatments:      Modalities:  MH    Timed Code Treatment Minutes:  35    Total Treatment Minutes:  50    Assessment  [x] Patient tolerated treatment well [] Patient limited by fatigue  [] Patient limited by pain  [] Patient limited by other medical complications  [x] Other:        Prognosis: [x] Good [] Fair  [] Poor    Patient Requires Follow-up: [x] Yes  [] No    Plan:   [x] Continue per plan of care [] Alter current plan (see comments)  [] Plan of care initiated [] Hold pending MD visit [] Discharge    Plan for Next Session:        Electronically signed by:  Adalgisa Bowers GP#[id number]

## 2019-06-10 ENCOUNTER — HOSPITAL ENCOUNTER (OUTPATIENT)
Dept: PHYSICAL THERAPY | Age: 84
Setting detail: THERAPIES SERIES
Discharge: HOME | End: 2019-06-10
Payer: MEDICARE

## 2019-06-10 PROCEDURE — 97110 THERAPEUTIC EXERCISES: CPT

## 2019-06-10 NOTE — FLOWSHEET NOTE
Physical Therapy Daily Treatment Note  Date:  6/10/2019    Patient Name:  Miladys Dupont    :  1935  MRN: [id number]  Restrictions/Precautions:    Pertinent Medical History: CKD, LB Sx x2  Medical/Treatment Diagnosis Information:  · Diagnosis: B Trochanteric Bursitis     Insurance/Certification information:    Medicare  Physician Information:      Dr. Samuel David of care signed (Y/N): Faxed    Visit# / total visits:      Pain level: 0/10 B hips     G-Code (if applicable):      Date / Visit # G-Code Applied:  /  PT G-Codes  Functional Assessment Tool Used: (LEFS)  Score: 53    Progress Note: []  Yes  [x]  No  Next due by: Visit #10      History of Injury:  States pain began ~ 5 months ago for no apparent reason)    Subjective:   Reports not improving, getting worse. Ok during the day, increases at night. Difficulty sleeping.      Objective:   Observation:   Test measurements:      Exercises:  Exercise/Equipment Resistance/Repetitions Other comments        Nu-step       #8 L-1 X 5 min         GSS/HSS 30 sec x 2 R/L 2nd step today   ITB stretch  (stand) 30 sec x 2 R/L         Rocker board F/B, S/S  45 sec ea  SBA    Standing hip abd 0# - x10 R/L Decreased wt/ reps per pt request   B gumdrops balance X 45 sec              Seated reclines 2x10    SLR 0# - 1x10     R/L    LTR 1x15           Bridges 3x10    Piriformis stretch Declined per pt   Hklg  T-band abd Green - 3x10    Supine  B LE traction               US    50%    1.5 w/cm2  Pt did not notice any benefit        MH   (sit) LB/ L ITB   X 15 min Heat feels good     Other Therapeutic Activities:  Issued hep booklet today 5/3/19    Home Exercise Program:   Voiced understanding of home exer    Manual Treatments:      Modalities:  MH    Timed Code Treatment Minutes:  35    Total Treatment Minutes:  50    Assessment  [x] Patient tolerated treatment well [] Patient limited by fatigue  [] Patient limited by pain  [] Patient limited by other medical complications  [x] Other:  PT notified of lack of progress. Discussed sleeping positions for comfort, breathing. Recommended trial of lying supine with wedge that pt has for breathing with roll under knees to see if that is more comfortable for hips.       Prognosis: [x] Good [] Fair  [] Poor    Patient Requires Follow-up: [x] Yes  [] No    Plan:   [x] Continue per plan of care [] Alter current plan (see comments)  [] Plan of care initiated [] Hold pending MD visit [] Discharge    Plan for Next Session:    Re-eval per PT    Electronically signed by:  Niall Escalona, PTA  [id number]

## 2019-06-12 ENCOUNTER — HOSPITAL ENCOUNTER (OUTPATIENT)
Dept: PHYSICAL THERAPY | Age: 84
Setting detail: THERAPIES SERIES
Discharge: HOME | End: 2019-06-12
Payer: MEDICARE

## 2019-06-12 PROCEDURE — 97110 THERAPEUTIC EXERCISES: CPT | Performed by: CHIROPRACTOR

## 2019-06-12 NOTE — DISCHARGE SUMMARY
Physical Therapy Discharge Note  Date: 2019    Patient Name: Bita Alberts  : 1935  MRN: [id number]    Diagnosis:   B Trochanteric Bursitis    Referring Physician: Dr. Tommy Selby    Dates of Service: 19 - 19  Total # of Visits:   14    Medicare Cap Total for 2019:    $913.26     Treatment to Date:  [x] Therapeutic Exercise  [] Modalities:  [x] Therapeutic Activity     [x] Ultrasound  [] Electrical Stim   [] Gait Training      [] Cervical Traction    [] Lumbar Traction  [] Neuromuscular Re-education  [] Cold/hotpack [] Iontophoresis  [x] Instruction in HEP      Other:  [] Manual Therapy       []    [] Aquatic Therapy       []                    ? Significant Findings At Last Visit:       Pt only has pain on lateral aspect of his hip while lying on his side at night. No c/o pain in hs hips during the day. He is independent with his home exer and reviewed sleeping posture alternatives. Pt decided to discontinue PT stating that is was non longer helping       Goal Status:  [] Achieved [x] Partially Achieved  [] Not Achieved     Reason for Discharge:  [] Goals Met   [] Patient did not return after initial evaluation   [] Progress Plateaued [] Missed _____ scheduled appointments   [] No insurance coverage [x] Patient terminated therapy   [] Other:        PT recommendation:   [x] Patient now discharged with HEP      [] Other:    Discharge discussed with:  [x] Patient  [] Caregiver      [] Other        Electronically signed by:  Tevin RODRIGUEZ#[id number]    If you have any questions or concerns, please don't hesitate to call.   Thank you for your referral.

## 2019-06-12 NOTE — FLOWSHEET NOTE
Physical Therapy Daily Treatment Note  Date:  2019    Patient Name:  Mary Banks    :  1935  MRN: [id number]  Restrictions/Precautions:    Pertinent Medical History: CKD, LB Sx x2  Medical/Treatment Diagnosis Information:  · Diagnosis: B Trochanteric Bursitis     Insurance/Certification information:    Medicare  Physician Information:      Dr. Jennyfer Puga of care signed (Y/N): Faxed    Visit# / total visits:      Pain level: 0/10 B hips     G-Code (if applicable):      Date / Visit # G-Code Applied:  /  PT G-Codes  Functional Assessment Tool Used: (LEFS)  Score: 53    Progress Note: []  Yes  [x]  No  Next due by: Visit #10      History of Injury:  States pain began ~ 5 months ago for no apparent reason)    Subjective:   Ok during the day, increases at night. Difficulty sleeping.      Objective:   Observation:   Test measurements:      Exercises:  Exercise/Equipment Resistance/Repetitions Other comments        Nu-step       #8 L-1 X 5 min         GSS/HSS 30 sec x 2 R/L 2nd step today   ITB stretch  (stand) 30 sec x 2 R/L         Rocker board F/B, S/S  45 sec ea  SBA    Standing hip abd 0# - x10 R/L Decreased wt/ reps per pt request   B gumdrops balance X 45 sec              Seated reclines 2x10    SLR 0# - 1x10     R/L    LTR 1x15           Bridges 3x10    Piriformis stretch Declined per pt   Hklg  T-band abd Green - 3x10    Supine  B LE traction               US    50%    1.5 w/cm2  Pt did not notice any benefit        MH   (sit) LB/ L ITB   X 15 min Heat feels good     Other Therapeutic Activities:  Issued hep booklet today 5/3/19    Home Exercise Program:   Voiced understanding of home exer    Manual Treatments:      Modalities:  MH    Timed Code Treatment Minutes:  35    Total Treatment Minutes:  50    Assessment  [x] Patient tolerated treatment well [] Patient limited by fatigue  [] Patient limited by pain  [] Patient limited by other medical complications  [x] Other:  PT notified of lack of progress. Discussed sleeping positions for comfort, breathing. Recommended trial of lying supine with wedge that pt has for breathing with roll under knees to see if that is more comfortable for hips.       Prognosis: [] Good [x] Fair  [] Poor    Patient Requires Follow-up: [] Yes  [x] No    Plan:   [x] Continue per plan of care [] Alter current plan (see comments)  [] Plan of care initiated [] Hold pending MD visit [x] Discharge    Plan for Next Session:    DC to HEP    Electronically signed by:  Ronnie Chandler [id number]

## 2019-06-14 ENCOUNTER — APPOINTMENT (OUTPATIENT)
Dept: PHYSICAL THERAPY | Age: 84
End: 2019-06-14
Payer: MEDICARE

## 2019-06-17 ENCOUNTER — APPOINTMENT (OUTPATIENT)
Dept: PHYSICAL THERAPY | Age: 84
End: 2019-06-17
Payer: MEDICARE

## 2020-10-05 RX ORDER — EPINEPHRINE 1 MG/ML
0.3 INJECTION, SOLUTION, CONCENTRATE INTRAVENOUS PRN
Status: CANCELLED | OUTPATIENT
Start: 2020-10-06

## 2020-10-05 RX ORDER — SODIUM CHLORIDE 9 MG/ML
INJECTION, SOLUTION INTRAVENOUS CONTINUOUS
Status: CANCELLED | OUTPATIENT
Start: 2020-10-06

## 2020-10-05 RX ORDER — DIPHENHYDRAMINE HCL 25 MG
25 TABLET ORAL ONCE
Status: CANCELLED | OUTPATIENT
Start: 2020-10-06

## 2020-10-05 RX ORDER — METHYLPREDNISOLONE SODIUM SUCCINATE 125 MG/2ML
125 INJECTION, POWDER, LYOPHILIZED, FOR SOLUTION INTRAMUSCULAR; INTRAVENOUS ONCE
Status: CANCELLED | OUTPATIENT
Start: 2020-10-06

## 2020-10-05 RX ORDER — DIPHENHYDRAMINE HYDROCHLORIDE 50 MG/ML
50 INJECTION INTRAMUSCULAR; INTRAVENOUS ONCE
Status: CANCELLED | OUTPATIENT
Start: 2020-10-06

## 2020-10-05 RX ORDER — SODIUM CHLORIDE 0.9 % (FLUSH) 0.9 %
20 SYRINGE (ML) INJECTION PRN
Status: CANCELLED | OUTPATIENT
Start: 2020-10-06

## 2020-10-05 RX ORDER — 0.9 % SODIUM CHLORIDE 0.9 %
10 VIAL (ML) INJECTION ONCE
Status: CANCELLED | OUTPATIENT
Start: 2020-10-06

## 2020-10-05 RX ORDER — ACETAMINOPHEN 325 MG/1
650 TABLET ORAL ONCE
Status: CANCELLED | OUTPATIENT
Start: 2020-10-06

## 2020-10-06 ENCOUNTER — HOSPITAL ENCOUNTER (OUTPATIENT)
Dept: INFUSION THERAPY | Age: 85
Setting detail: INFUSION SERIES
Discharge: HOME | End: 2020-10-06
Payer: MEDICARE

## 2020-10-06 DIAGNOSIS — C91.Z0 LARGE GRANULAR LYMPHOCYTIC LEUKEMIA (HCC): Primary | ICD-10-CM

## 2020-10-06 LAB
ABO/RH: NORMAL
ANTIBODY SCREEN: NORMAL

## 2020-10-06 PROCEDURE — 86850 RBC ANTIBODY SCREEN: CPT

## 2020-10-06 PROCEDURE — 86900 BLOOD TYPING SEROLOGIC ABO: CPT

## 2020-10-06 PROCEDURE — P9035 PLATELET PHERES LEUKOREDUCED: HCPCS

## 2020-10-06 PROCEDURE — 86901 BLOOD TYPING SEROLOGIC RH(D): CPT

## 2020-10-06 RX ORDER — 0.9 % SODIUM CHLORIDE 0.9 %
10 VIAL (ML) INJECTION ONCE
Status: CANCELLED | OUTPATIENT
Start: 2020-10-06

## 2020-10-06 RX ORDER — SODIUM CHLORIDE 9 MG/ML
INJECTION, SOLUTION INTRAVENOUS CONTINUOUS
Status: CANCELLED | OUTPATIENT
Start: 2020-10-06

## 2020-10-06 RX ORDER — ACETAMINOPHEN 325 MG/1
650 TABLET ORAL ONCE
Status: CANCELLED | OUTPATIENT
Start: 2020-10-06

## 2020-10-06 RX ORDER — METHYLPREDNISOLONE SODIUM SUCCINATE 125 MG/2ML
125 INJECTION, POWDER, LYOPHILIZED, FOR SOLUTION INTRAMUSCULAR; INTRAVENOUS ONCE
Status: CANCELLED | OUTPATIENT
Start: 2020-10-06

## 2020-10-06 RX ORDER — DIPHENHYDRAMINE HYDROCHLORIDE 50 MG/ML
50 INJECTION INTRAMUSCULAR; INTRAVENOUS ONCE
Status: CANCELLED | OUTPATIENT
Start: 2020-10-06

## 2020-10-06 RX ORDER — EPINEPHRINE 1 MG/ML
0.3 INJECTION, SOLUTION, CONCENTRATE INTRAVENOUS PRN
Status: CANCELLED | OUTPATIENT
Start: 2020-10-06

## 2020-10-06 RX ORDER — DIPHENHYDRAMINE HCL 25 MG
25 TABLET ORAL ONCE
Status: CANCELLED | OUTPATIENT
Start: 2020-10-06

## 2020-10-06 RX ORDER — SODIUM CHLORIDE 0.9 % (FLUSH) 0.9 %
20 SYRINGE (ML) INJECTION PRN
Status: CANCELLED | OUTPATIENT
Start: 2020-10-06

## 2020-10-06 NOTE — PROGRESS NOTES
Outpatient [facility] 1978    Peripheral Lab Draw    NAME:  Sarah Bland  YOB: 1935  MEDICAL RECORD NUMBER:  [id number]  Episode Date:  10/6/2020     Patient with history of Large granular Lymphocytic Leukemia and currently being treated by Dr. Jennifer Chatterjee. Patient states past treatments have failed and he doesn't think anything will work right now. Blood Draw Site: Location:right arm  Site cleansed with Chloroprep Scrub for 30 seconds: Yes  Site cleansed with Alcohol pads: No:   Labs drawn with: 23 gauge             [x] Butterfly    [] Needle  Labs Obtained: Yes  Number of attempts: 1    Lab Test(s) Ordered: Type and Screen    Lab Draw Site:  Redness: No  Bruising: No   Edema: No  Pain: No     Response to treatment:  Well tolerated by patient. Patient will return tomorrow for platelet transfusion.      Electronically signed by Phillip Pavon RN on 10/6/2020 at 5:47 PM

## 2020-10-07 ENCOUNTER — HOSPITAL ENCOUNTER (OUTPATIENT)
Dept: INFUSION THERAPY | Age: 85
Setting detail: INFUSION SERIES
Discharge: HOME | End: 2020-10-07
Payer: MEDICARE

## 2020-10-07 VITALS
RESPIRATION RATE: 17 BRPM | DIASTOLIC BLOOD PRESSURE: 56 MMHG | HEART RATE: 65 BPM | OXYGEN SATURATION: 96 % | BODY MASS INDEX: 25.58 KG/M2 | WEIGHT: 163 LBS | SYSTOLIC BLOOD PRESSURE: 122 MMHG | HEIGHT: 67 IN | TEMPERATURE: 98.1 F

## 2020-10-07 DIAGNOSIS — C91.Z0 LARGE GRANULAR LYMPHOCYTIC LEUKEMIA (HCC): Primary | ICD-10-CM

## 2020-10-07 LAB
BLOOD BANK DISPENSE STATUS: NORMAL
BLOOD BANK PRODUCT CODE: NORMAL
BPU ID: NORMAL
DESCRIPTION BLOOD BANK: NORMAL

## 2020-10-07 PROCEDURE — 36430 TRANSFUSION BLD/BLD COMPNT: CPT

## 2020-10-07 PROCEDURE — 2580000003 HC RX 258: Performed by: INTERNAL MEDICINE

## 2020-10-07 PROCEDURE — P9035 PLATELET PHERES LEUKOREDUCED: HCPCS

## 2020-10-07 PROCEDURE — 6370000000 HC RX 637 (ALT 250 FOR IP): Performed by: INTERNAL MEDICINE

## 2020-10-07 RX ORDER — ACETAMINOPHEN 325 MG/1
650 TABLET ORAL ONCE
Status: COMPLETED | OUTPATIENT
Start: 2020-10-07 | End: 2020-10-07

## 2020-10-07 RX ORDER — DIPHENHYDRAMINE HYDROCHLORIDE 50 MG/ML
50 INJECTION INTRAMUSCULAR; INTRAVENOUS ONCE
Status: CANCELLED | OUTPATIENT
Start: 2020-10-07

## 2020-10-07 RX ORDER — METHYLPREDNISOLONE SODIUM SUCCINATE 125 MG/2ML
125 INJECTION, POWDER, LYOPHILIZED, FOR SOLUTION INTRAMUSCULAR; INTRAVENOUS ONCE
Status: CANCELLED | OUTPATIENT
Start: 2020-10-07

## 2020-10-07 RX ORDER — 0.9 % SODIUM CHLORIDE 0.9 %
10 VIAL (ML) INJECTION ONCE
Status: CANCELLED | OUTPATIENT
Start: 2020-10-07

## 2020-10-07 RX ORDER — SODIUM CHLORIDE 9 MG/ML
INJECTION, SOLUTION INTRAVENOUS CONTINUOUS
Status: CANCELLED | OUTPATIENT
Start: 2020-10-07

## 2020-10-07 RX ORDER — SODIUM CHLORIDE 9 MG/ML
INJECTION, SOLUTION INTRAVENOUS CONTINUOUS
Status: DISCONTINUED | OUTPATIENT
Start: 2020-10-07 | End: 2020-10-08 | Stop reason: HOSPADM

## 2020-10-07 RX ORDER — DIPHENHYDRAMINE HCL 25 MG
25 TABLET ORAL ONCE
Status: CANCELLED | OUTPATIENT
Start: 2020-10-07

## 2020-10-07 RX ORDER — SODIUM CHLORIDE 0.9 % (FLUSH) 0.9 %
20 SYRINGE (ML) INJECTION PRN
Status: DISCONTINUED | OUTPATIENT
Start: 2020-10-07 | End: 2020-10-08 | Stop reason: HOSPADM

## 2020-10-07 RX ORDER — EPINEPHRINE 1 MG/ML
0.3 INJECTION, SOLUTION, CONCENTRATE INTRAVENOUS PRN
Status: CANCELLED | OUTPATIENT
Start: 2020-10-07

## 2020-10-07 RX ORDER — SODIUM CHLORIDE 9 MG/ML
INJECTION, SOLUTION INTRAVENOUS
Status: DISCONTINUED
Start: 2020-10-07 | End: 2020-10-08 | Stop reason: HOSPADM

## 2020-10-07 RX ORDER — SODIUM CHLORIDE 0.9 % (FLUSH) 0.9 %
20 SYRINGE (ML) INJECTION PRN
Status: CANCELLED | OUTPATIENT
Start: 2020-10-07

## 2020-10-07 RX ORDER — ACETAMINOPHEN 325 MG/1
650 TABLET ORAL ONCE
Status: CANCELLED | OUTPATIENT
Start: 2020-10-07

## 2020-10-07 RX ORDER — DIPHENHYDRAMINE HCL 25 MG
25 TABLET ORAL ONCE
Status: COMPLETED | OUTPATIENT
Start: 2020-10-07 | End: 2020-10-07

## 2020-10-07 RX ADMIN — DIPHENHYDRAMINE HCL 25 MG: 25 TABLET ORAL at 12:53

## 2020-10-07 RX ADMIN — SODIUM CHLORIDE: 9 INJECTION, SOLUTION INTRAVENOUS at 12:55

## 2020-10-07 RX ADMIN — ACETAMINOPHEN 650 MG: 325 TABLET ORAL at 12:53

## 2020-10-07 ASSESSMENT — PAIN SCALES - GENERAL: PAINLEVEL_OUTOF10: 0

## 2020-10-07 NOTE — PROGRESS NOTES
Outpatient [id number] [facility]    Platelet Transfusion    NAME:  Nell Person  YOB: 1935  MEDICAL RECORD NUMBER:  [id number]  DATE:  10/7/2020     Patient with Large Lymphocytic Leukemia. Here for platelets. States he is in final stage of life . Does not think treatment will help much. Patient arrived to [facility]   [] per wheelchair   [x] ambulatory    Patient has Thrombocytopenia due to:   [x] Bone Marrow Suppression related to Chemotherapy   [] History of Idiopathic Thrombocytopenia   [] Myelodysplastic Syndrome   [] History of Pancytopenia       Is the patient experiencing any:  Fatigue: no  [x]   None  []   Increase over baseline but not altering normal activities  []   Moderate of causing difficulty performing some activities  []   Severe or loss of ability to perform some activities  []   Bedridden or disabling    Dizziness or Lightheadedness: no  [x]   None  []   No Interference  []   Interferes with functioning but not activities of daily living  []   Interferes with daily activies  []   Bedridden or disabling    Shortness of Breath: no  [x]   None   []   Dyspneic on exertion   []   Dyspnea with normal activities  []   Dyspnea at rest      Consent Obtained: Yes  Is this the patient's first Transfusion? Yes   Did the patient experience any adverse reactions to previous Transfusion? NA    Patient currently having active bleeding? No   Nose bleed? No  Bleeding mouth or gums? No  Blood noted in urine? No  Blood noted in stool? No  Patient have unexplained bruising? No   Patient have petechiae? No   Headache? No  Blurry Vision? No  Patient stated he has bumped his leg twice in past week and hand some difficulty stopping. Instructed him to apply pressure and elevate extremity  until bleeding  Stops.     Patient Active Problem List   Diagnosis    Large granular lymphocytic leukemia (HCC)    Neutropenia associated with autoimmune disease ([facility] [address]

## 2021-03-22 ENCOUNTER — APPOINTMENT (OUTPATIENT)
Dept: GENERAL RADIOLOGY | Age: 86
End: 2021-03-22
Payer: MEDICARE

## 2021-03-22 ENCOUNTER — HOSPITAL ENCOUNTER (OUTPATIENT)
Dept: GENERAL RADIOLOGY | Age: 86
Discharge: HOME | End: 2021-03-22
Payer: MEDICARE

## 2021-03-22 DIAGNOSIS — M70.62 GREATER TROCHANTERIC BURSITIS OF BOTH HIPS: ICD-10-CM

## 2021-03-22 DIAGNOSIS — M70.61 GREATER TROCHANTERIC BURSITIS OF BOTH HIPS: ICD-10-CM

## 2021-03-22 PROCEDURE — 2500000003 HC RX 250 WO HCPCS

## 2021-03-22 PROCEDURE — 77002 NEEDLE LOCALIZATION BY XRAY: CPT

## 2021-03-22 PROCEDURE — 20610 DRAIN/INJ JOINT/BURSA W/O US: CPT

## 2021-03-22 PROCEDURE — 6360000002 HC RX W HCPCS

## 2021-03-22 PROCEDURE — 2709999900 FL ARTHR/ASP/INJ MAJOR JT/BURSA RT WO US

## 2021-03-22 PROCEDURE — 6360000004 HC RX CONTRAST MEDICATION: Performed by: RADIOLOGY

## 2021-03-22 RX ORDER — TRIAMCINOLONE ACETONIDE 40 MG/ML
40 INJECTION, SUSPENSION INTRA-ARTICULAR; INTRAMUSCULAR ONCE
Status: DISCONTINUED | OUTPATIENT
Start: 2021-03-22 | End: 2021-03-23 | Stop reason: HOSPADM

## 2021-03-22 RX ADMIN — IOPAMIDOL 10 ML: 612 INJECTION, SOLUTION INTRAVENOUS at 14:01

## 2021-04-15 ENCOUNTER — HOSPITAL ENCOUNTER (OUTPATIENT)
Dept: PHYSICAL THERAPY | Age: 86
Setting detail: THERAPIES SERIES
Discharge: HOME | End: 2021-04-15
Payer: MEDICARE

## 2021-04-15 PROCEDURE — 97035 APP MDLTY 1+ULTRASOUND EA 15: CPT | Performed by: CHIROPRACTOR

## 2021-04-15 PROCEDURE — 97530 THERAPEUTIC ACTIVITIES: CPT | Performed by: CHIROPRACTOR

## 2021-04-15 PROCEDURE — 97161 PT EVAL LOW COMPLEX 20 MIN: CPT | Performed by: CHIROPRACTOR

## 2021-04-15 NOTE — PLAN OF CARE
[facility], [facility]  [address] [place], [facility]  Phone: [phone]   Fax:     [phone]                                                       Physical Therapy Certification    Dear Referring Practitioner: Dr. Lexie Fernando,    We had the pleasure of evaluating the following patient for physical therapy services at [facility]. A summary of our findings can be found in the initial assessment below. This includes our plan of care. If you have any questions or concerns regarding these findings, please do not hesitate to contact me at the office phone number checked above. Thank you for the referral.       Physician Signature:_______________________________Date:__________________  By signing above (or electronic signature), therapists plan is approved by physician              Patient: Rea Johnson   : 1935   MRN: [id number]  Referring Physician: Referring Practitioner: Dr. Lexie Fernando      Evaluation Date: 4/15/2021      Medical Diagnosis Information:  Diagnosis: Trochanteric Bursitis of both hips   Treatment Diagnosis: Pain in R side LB/buttock                                         Insurance information: PT Insurance Information: Medicare     Precautions/ Contra-indications:   Latex Allergy:  [x]NO      []YES  Preferred Language for Healthcare:   [x]English       []other:    C-SSRS Triggered by Intake questionnaire (Past 2 wk assessment ):   [x] No, Questionnaire did not trigger screening.   [] Yes, Patient intake triggered C-SSRS Screening      [] C-SSRS Screening completed  [] PCP notified via Epic     SUBJECTIVE: States that he had cortisone injection in both hips ~ 1 month ago and reports that L hip is feeling much better.  He is rubbing Biofreeze on his R hip at home which he reports as beneficial    Relevant Medical History:Additional Pertinent Hx: Large Lymphocytic Leukemia, Arthritis, CAD, HTN, CABG  Functional Scale/Score: WOMAC = 20     Pain Scale: 6-7/10  Easing factors:   Provocative factors: Sleeping posture    Type: []Constant   [x]Intermittent  []Radiating []Localized []other:     Numbness/Tingling: Intermittent    Occupation/School:  Retired    Living Status/Prior Level of Function: Independent with ADLs and IADLs    OBJECTIVE:   Palpation: TTP in R lumbar/upper buttock    Functional Mobility/Transfers: Independent    Posture: Sits with reduced lumbar lordosis    Bandages/Dressings/Incisions:    Gait: (Amb without any assistive device)    ROM LEFT RIGHT   HIP Flex 105 110   HIP Abd 30 30   HIP Ext 0 0   HIP IR     HIP ER     Knee ext     Knee Flex     Ankle PF     Ankle DF     Ankle In     Ankle Ev     Strength  LEFT RIGHT   HIP Flexors 5/5 4/5   HIP Abductors 5/5 4/5   HIP Ext 5/5 4+/5   Hip ER     Knee EXT (quad) 5/5 5/5   Knee Flex (HS) 5/5 5/5   Ankle DF     Ankle PF     Ankle Inv     Ankle EV          Circumference  Mid apex  7 cm prox             Lumbar mobility:  50% deficit in flex/ext/ R SB and 25% in L SB  Significant HS tightness : Passive SLR only to 30 degrees B      Reflexes/Sensation:    [x]Dermatomes/Myotomes intact    [x]Reflexes equal and normal bilaterally   []Other:    Joint mobility:    []Normal    []Hypo   []Hyper    Orthopedic Special Tests:                        [x] Patient history, allergies, meds reviewed. Medical chart reviewed. See intake form. Review Of Systems (ROS):  [x]Performed Review of systems (Integumentary, CardioPulmonary, Neurological) by intake and observation. Intake form has been scanned into medical record. Patient has been instructed to contact their primary care physician regarding ROS issues if not already being addressed at this time.       Co-morbidities/Complexities (which will affect course of rehabilitation):   []None           Arthritic conditions   []Rheumatoid arthritis (M05.9)  []Osteoarthritis (M19.91)   Cardiovascular conditions   [x]Hypertension (I10)  []Hyperlipidemia (E78.5)  []Angina pectoris (I20)  []Atherosclerosis (I70)  []CVA Musculoskeletal conditions   []Disc pathology   []Congenital spine pathologies   []Prior surgical intervention  []Osteoporosis (M81.8)  []Osteopenia (M85.8)   Endocrine conditions   []Hypothyroid (E03.9)  []Hyperthyroid Gastrointestinal conditions   []Constipation (P40.33)   Metabolic conditions   []Morbid obesity (E66.01)  []Diabetes type 1(E10.65) or 2 (E11.65)   []Neuropathy (G60.9)     Pulmonary conditions   []Asthma (J45)  []Coughing   []COPD (J44.9)   Psychological Disorders  []Anxiety (F41.9)  []Depression (F32.9)   []Other:   [x]Other:  CAD, CABG        Barriers to/and or personal factors that will affect rehab potential:              []Age  []Sex    []Smoker              []Motivation/Lack of Motivation                        [x]Co-Morbidities              []Cognitive Function, education/learning barriers              []Environmental, home barriers              []profession/work barriers  []past PT/medical experience  []other:  Justification:     Falls Risk Assessment (30 days):   [x] Falls Risk assessed and no intervention required.   [] Falls Risk assessed and Patient requires intervention due to being higher risk   TUG score (>12s at risk):     [] Falls education provided, including         ASSESSMENT:   Functional Impairments:     [x]Noted lumbar/proximal hip/LE hypomobility   []Decreased LE functional ROM   [x]Decreased core/proximal hip strength and neuromuscular control   [x]Decreased LE functional strength   []Reduced balance/proprioceptive control   []other:      Functional Activity Limitations (from functional questionnaire and intake)   []Reduced ability to tolerate prolonged functional positions   []Reduced ability or difficulty with changes of positions or transfers between positions   []Reduced ability to maintain good posture and demonstrate good body mechanics with sitting, bending, and lifting   [x]Reduced ability to sleep   [] Reduced ability or tolerance with driving and/or computer work   []Reduced ability to perform lifting, carrying tasks   []Reduced ability to squat   []Reduced ability to forward bend   []Reduced ability to ambulate prolonged functional periods/distances/surfaces   []Reduced ability to ascend/descend stairs   []Reduced ability to run, hop or jump   []other:     Participation Restrictions   []Reduced participation in self care activities   []Reduced participation in home management activities   []Reduced participation in work activities   []Reduced participation in social activities. []Reduced participation in sport activities. Classification :    []Signs/symptoms consistent with post-surgical status including decreased ROM, strength and function.    []Signs/symptoms consistent with joint sprain/strain   []Signs/symptoms consistent with patella-femoral syndrome   []Signs/symptoms consistent with knee OA/hip OA   []Signs/symptoms consistent with internal derangement of knee/Hip   []Signs/symptoms consistent with functional hip weakness/NMR control      []Signs/symptoms consistent with tendinitis/tendinosis    []signs/symptoms consistent with pathology which may benefit from Dry needling      []other:      Prognosis/Rehab Potential:      []Excellent   [x]Good    []Fair   []Poor    Tolerance of evaluation/treatment:    []Excellent   [x]Good    []Fair   []Poor    Physical Therapy Evaluation Complexity Justification  [x] A history of present problem with:  [] no personal factors and/or comorbidities that impact the plan of care;  [x]1-2 personal factors and/or comorbidities that impact the plan of care  []3 personal factors and/or comorbidities that impact the plan of care  [x] An examination of body systems using standardized tests and measures addressing any of the following: body structures and functions (impairments), activity limitations, and/or participation restrictions;:  [x] a total of 1-2 or more elements   [] a total of 3 or more elements   [] a total of 4 or more elements   [x] A clinical presentation with:  [x] stable and/or uncomplicated characteristics   [] evolving clinical presentation with changing characteristics  [] unstable and unpredictable characteristics;   [x] Clinical decision making of [] low, [] moderate, [] high complexity using standardized patient assessment instrument and/or measurable assessment of functional outcome. [x] EVAL (LOW) 25196 (typically 20 minutes face-to-face)  [] EVAL (MOD) 85690 (typically 30 minutes face-to-face)  [] EVAL (HIGH) 97412 (typically 45 minutes face-to-face)  [] RE-EVAL     PLAN:  Frequency/Duration: 2 days per week for 6 Weeks:  Interventions:  [x]  Therapeutic exercise including: strength training, ROM, for Lower extremity and core   [x]  NMR activation and proprioception for LE, Glutes and Core   [x]  Manual therapy as indicated for LE, Hip and spine to include: Dry Needling/IASTM, STM, PROM, Gr I-IV mobilizations, manipulation. [x] Modalities as needed that may include: thermal agents, E-stim, Biofeedback, US, iontophoresis as indicated  [x] Patient education on joint protection, postural re-education, activity modification, progression of HEP. [] Aquatic exercise including: strength training, ROM, and balance for Lower extremity and core     HEP instruction: Pt voiced understanding of home exer and posture (especially sleeping posture)    GOALS:  Patient stated goal: Be more active with less pain  [] Progressing: [] Met: [] Not Met: [] Adjusted    Therapist goals for Patient:   Short Term Goals: To be achieved in: 2 weeks  1. Independent in HEP and progression per patient tolerance, in order to prevent re-injury. [] Progressing: [] Met: [] Not Met: [] Adjusted  2.  Patient will have a decrease in pain to facilitate improvement in movement,

## 2021-04-15 NOTE — FLOWSHEET NOTE
[facility] - Outpatient Rehabilitation and Therapy, [place]  [address] [facility]  Phone: [phone]   Fax:     [phone]      Physical Therapy Treatment Note/ Progress Report:     Date:  4/15/2021    Patient Name:  Grace Patel    :  1935  MRN: [id number]    Pertinent Medical History:Additional Pertinent Hx: Large Lymphocytic Leukemia, Arthritis, CAD, HTN, CABG    Medical/Treatment Diagnosis Information:  · Diagnosis: Trochanteric Bursitis of both hips  · Treatment Diagnosis: Pain in R side LB/buttock    Insurance/Certification information:   Medicare  Physician Information:  Dr. Paredes Faisal of care signed (Y/N): Faxed    Date of Patient follow up with Physician:      Progress Report: []  Yes  [x]  No     Date Range for reporting period:  Beginnin/15/2021  Ending:      Progress report due (10 Rx/or 30 days whichever is less):     Recertification due (POC duration/ or 90 days whichever is less):      Visit # POC/Insurance Allowable Auth Needed    BOMN []Yes   [x]No     Latex Allergy:  [x]NO      []YES  Preferred Language for Healthcare:   [x]English       []Other:    Functional Scale:       Date assessed: at eval  Test: WOMAC  Score: 20    Pain level:  6-7/10     History of Injury:   Had progressively increasing pain of B posterior hips.  Had cortisone injections in each ~ 1 month with significant improvement of only the L hip pain    SUBJECTIVE:  C/o R side LBP/ upper buttock pain    OBJECTIVE:   Observation:    Test measurements:      RESTRICTIONS/PRECAUTIONS:    Exercises/Interventions:     Therapeutic Ex (18613)   Min: Reps/Resistance Notes/CUES             GSS/HSS 30 sec x 2         SLR     Bridges     S/L abd     Add squeeze                              Therapeutic Activity (53760) Min:           Reviewed home exer                         NMR re-education (76103)  Min:  CUES NEEDED             Manual Intervention (69428) Min:                               Modalities  Min:          US to R side LB X 8 min                Other Therapeutic Activities: Pt was educated on PT POC, Diagnosis, Prognosis, pathomechanics as well as frequency and duration of scheduling future physical therapy appointments. Time was also taken on this day to answer all patient questions and participation in PT. Reviewed appointment policy in detail with patient and patient verbalized understanding. Home Exercise Program: Patient was instructed in the following for HEP:     . Patient verbalized/demonstrated understanding and was issued written handout. Therapeutic Exercise and NMR EXR  [] (60674) Provided verbal/tactile cueing for activities related to strengthening, flexibility, endurance, ROM for improvements in LE, proximal hip, and core control with self care, mobility, lifting, ambulation.  [] (04523) Provided verbal/tactile cueing for activities related to improving balance, coordination, kinesthetic sense, posture, motor skill, proprioception  to assist with LE, proximal hip, and core control in self care, mobility, lifting, ambulation and eccentric single leg control.  [address] and Therapeutic Activities:    [] (55452 or 33549) Provided verbal/tactile cueing for activities related to improving balance, coordination, kinesthetic sense, posture, motor skill, proprioception and motor activation to allow for proper function of core, proximal hip and LE with self care and ADLs and functional mobility.   [] (59982) Gait Re-education- Provided training and instruction to the patient for proper LE, core and proximal hip recruitment and positioning and eccentric body weight control with ambulation re-education including up and down stairs     Home Exercise Program:    [x] (45974) Reviewed/Progressed HEP activities related to strengthening, flexibility, endurance, ROM of core, proximal hip and LE for functional self-care, mobility, lifting and ambulation/stair navigation   [] (41522)Reviewed/Progressed HEP activities related to improving balance, coordination, kinesthetic sense, posture, motor skill, proprioception of core, proximal hip and LE for self care, mobility, lifting, and ambulation/stair navigation      Manual Treatments:  PROM / STM / Oscillations-Mobs:  G-I, II, III, IV (PA's, Inf., Post.)  [] (15689) Provided manual therapy to mobilize LE, proximal hip and/or LS spine soft tissue/joints for the purpose of modulating pain, promoting relaxation,  increasing ROM, reducing/eliminating soft tissue swelling/inflammation/restriction, improving soft tissue extensibility and allowing for proper ROM for normal function with self care, mobility, lifting and ambulation. If [facility] Please Indicate Time In/Out  CPT Code Time in Time out                         Charges:  Timed Code Treatment Minutes: 30   Total Treatment Minutes: 45      [x] EVAL (LOW) 68729 (typically 20 minutes face-to-face)  [] EVAL (MOD) 05889 (typically 30 minutes face-to-face)  [] EVAL (HIGH) 36619 (typically 45 minutes face-to-face)  [] RE-EVAL     [] [place](94310) x     [] Dry needle 1 or 2 Muscles (83667)  [] NMR (49530) x     [] Dry needle 3+ Muscles (08503)  [] Manual (02268) x     [x] Ultrasound (77252) x  [x] TA (17538) x     [] Mech Traction (20048)  [] ES(attended) (51431)     [] ES (un) (82040):   [] Vasopump (40538) [] Ionto (77568)   [] Other:    GOALS:  Patient stated goal: Be more active with less pain  [] Progressing: [] Met: [] Not Met: [] Adjusted    Therapist goals for Patient:   Short Term Goals: To be achieved in: 2 weeks  1. Independent in HEP and progression per patient tolerance, in order to prevent re-injury. [] Progressing: [] Met: [] Not Met: [] Adjusted  2. Patient will have a decrease in pain to facilitate improvement in movement, function, and ADLs as indicated by Functional Deficits.   [] Progressing: [] Met: [] Not Met: [] Adjusted    Long Term Goals: To be 10% or less for the LEFS to assist with reaching prior level of function. [] Progressing: [] Met: [] Not Met: [] Adjusted  2. Patient will demonstrate increased AROM  to allow for proper joint functioning as indicated by patients Functional Deficits. [] Progressing: [] Met: [] Not Met: [] Adjusted  3. Patient will demonstrate an increase in Strength to good proximal hip strength and control, within 5lb HHD in LE to allow for proper functional mobility as indicated by patients Functional Deficits. [] Progressing: [] Met: [] Not Met: [] Adjusted  4. Patient will return to usual functional activities without increased symptoms or restriction. [] Progressing: [] Met: [] Not Met: [] Adjusted  5. Be able to sleep > 4 hrs    [] Progressing: [] Met: [] Not Met: [] Adjusted     ASSESSMENT:  See eval    Treatment/Activity Tolerance:  [x] Patient tolerated treatment well [] Patient limited by fatique  [] Patient limited by pain  [] Patient limited by other medical complications  [] Other:     Overall Progression Towards Functional goals/ Treatment Progress Update:  [] Patient is progressing as expected towards functional goals listed. [] Progression is slowed due to complexities/Impairments listed. [] Progression has been slowed due to co-morbidities.   [x] Plan just implemented, too soon to assess goals progression <30days   [] Goals require adjustment due to lack of progress  [] Patient is not progressing as expected and requires additional follow up with physician  [] Other    Prognosis for POC: [x] Good [] Fair  [] Poor    Patient requires continued skilled intervention: [x] Yes  [] No         PLAN:   [] Continue per plan of care [] Alter current plan (see comments)  [x] Plan of care initiated [] Hold pending MD visit [] Discharge    Electronically signed by: Marivel GARLAND#[id number]    Note: If patient does not return for scheduled/recommended follow up visits, this note will serve as a discharge

## 2021-04-20 ENCOUNTER — HOSPITAL ENCOUNTER (OUTPATIENT)
Dept: PHYSICAL THERAPY | Age: 86
Setting detail: THERAPIES SERIES
Discharge: HOME | End: 2021-04-20
Payer: MEDICARE

## 2021-04-20 PROCEDURE — 97110 THERAPEUTIC EXERCISES: CPT | Performed by: CHIROPRACTOR

## 2021-04-20 PROCEDURE — 97035 APP MDLTY 1+ULTRASOUND EA 15: CPT | Performed by: CHIROPRACTOR

## 2021-04-20 NOTE — FLOWSHEET NOTE
[facility] - Outpatient Rehabilitation and Therapy, [facility]  [address] [facility], [facility]  Phone: [phone]   Fax:     [phone]      Physical Therapy Treatment Note/ Progress Report:     Date:  2021    Patient Name:  Daija Reddy    :  1935  MRN: [id number]    Pertinent Medical History:Additional Pertinent Hx: Large Lymphocytic Leukemia, Arthritis, CAD, HTN, CABG    Medical/Treatment Diagnosis Information:  · Diagnosis: Trochanteric Bursitis of both hips  · Treatment Diagnosis: Pain in R side LB/buttock    Insurance/Certification information:   Medicare  Physician Information:  Dr. Aamir Romero of care signed (Y/N): Faxed    Date of Patient follow up with Physician:      Progress Report: []  Yes  [x]  No     Date Range for reporting period:  Beginnin2021  Ending:      Progress report due (10 Rx/or 30 days whichever is less):     Recertification due (POC duration/ or 90 days whichever is less):      Visit # POC/Insurance Allowable Auth Needed    BOMN []Yes   [x]No     Latex Allergy:  [x]NO      []YES  Preferred Language for Healthcare:   [x]English       []Other:    Functional Scale:       Date assessed: at eval  Test: WOMAC  Score: 20    Pain level:  0/10 at present     History of Injury:   Had progressively increasing pain of B posterior hips.  Had cortisone injections in each ~ 1 month with significant improvement of only the L hip pain    SUBJECTIVE:  C/o R side LBP/ upper buttock pain                            21- Reports only intermittent central LBP  OBJECTIVE:   Observation:    Test measurements:      RESTRICTIONS/PRECAUTIONS:    Exercises/Interventions:     Therapeutic Ex (65901)   Min: Reps/Resistance Notes/CUES        Nu-step          GSS/HSS 30 sec x 2         Seated reclines x10         SLR 0# - 1x10   R/L    Bridges 1x10    S/L abd     Add squeeze x20    Hklg Abd Lime - x20    LTR x10 R/L                   Therapeutic Activity (80352) Min:           Reviewed home exer                         NMR re-education (49850)  Min:  CUES NEEDED             Manual Intervention (25680) Min:           B man LE traction 20 sec x 4                   Modalities  Min:          US to R side LB X 8 min                Other Therapeutic Activities: Pt was educated on PT POC, Diagnosis, Prognosis, pathomechanics as well as frequency and duration of scheduling future physical therapy appointments. Time was also taken on this day to answer all patient questions and participation in PT. Reviewed appointment policy in detail with patient and patient verbalized understanding. Home Exercise Program: Patient was instructed in the following for HEP:     . Patient verbalized/demonstrated understanding and was issued written handout. Therapeutic Exercise and NMR EXR  [] (95744) Provided verbal/tactile cueing for activities related to strengthening, flexibility, endurance, ROM for improvements in LE, proximal hip, and core control with self care, mobility, lifting, ambulation.  [] (81700) Provided verbal/tactile cueing for activities related to improving balance, coordination, kinesthetic sense, posture, motor skill, proprioception  to assist with LE, proximal hip, and core control in self care, mobility, lifting, ambulation and eccentric single leg control.  [address] and Therapeutic Activities:    [] (65084 or 29972) Provided verbal/tactile cueing for activities related to improving balance, coordination, kinesthetic sense, posture, motor skill, proprioception and motor activation to allow for proper function of core, proximal hip and LE with self care and ADLs and functional mobility.   [] (14673) Gait Re-education- Provided training and instruction to the patient for proper LE, core and proximal hip recruitment and positioning and eccentric body weight control with ambulation re-education including up and down Adjusted  2. Patient will have a decrease in pain to facilitate improvement in movement, function, and ADLs as indicated by Functional Deficits. [] Progressing: [] Met: [] Not Met: [] Adjusted    Long Term Goals: To be 10% or less for the LEFS to assist with reaching prior level of function. [] Progressing: [] Met: [] Not Met: [] Adjusted  2. Patient will demonstrate increased AROM  to allow for proper joint functioning as indicated by patients Functional Deficits. [] Progressing: [] Met: [] Not Met: [] Adjusted  3. Patient will demonstrate an increase in Strength to good proximal hip strength and control, within 5lb HHD in LE to allow for proper functional mobility as indicated by patients Functional Deficits. [] Progressing: [] Met: [] Not Met: [] Adjusted  4. Patient will return to usual functional activities without increased symptoms or restriction. [] Progressing: [] Met: [] Not Met: [] Adjusted  5. Be able to sleep > 4 hrs    [] Progressing: [] Met: [] Not Met: [] Adjusted     ASSESSMENT:  See eval    Treatment/Activity Tolerance:  [x] Patient tolerated treatment well [] Patient limited by fatique  [] Patient limited by pain  [] Patient limited by other medical complications  [] Other:     Overall Progression Towards Functional goals/ Treatment Progress Update:  [] Patient is progressing as expected towards functional goals listed. [] Progression is slowed due to complexities/Impairments listed. [] Progression has been slowed due to co-morbidities.   [x] Plan just implemented, too soon to assess goals progression <30days   [] Goals require adjustment due to lack of progress  [] Patient is not progressing as expected and requires additional follow up with physician  [] Other    Prognosis for POC: [x] Good [] Fair  [] Poor    Patient requires continued skilled intervention: [x] Yes  [] No         PLAN:   [x] Continue per plan of care [] Alter current plan (see comments)  [] Plan of care initiated [] Hold pending MD visit [] Discharge    Electronically signed by: Earl Pillai [id number]    Note: If patient does not return for scheduled/recommended follow up visits, this note will serve as a discharge from care along with the most recent update on progress.

## 2021-04-23 ENCOUNTER — HOSPITAL ENCOUNTER (OUTPATIENT)
Dept: PHYSICAL THERAPY | Age: 86
Setting detail: THERAPIES SERIES
Discharge: HOME | End: 2021-04-23
Payer: MEDICARE

## 2021-04-23 NOTE — FLOWSHEET NOTE
[place] and Therapy, [facility]  [address] [facility], [facility]  Phone: [phone]   Fax:     [phone]    Physical Therapy  Cancellation/No-show Note  Patient Name:  Petra Granda  :  1935   Date:  2021  Cancelled visits to date: 1  No-shows to date: 0    Patient status for today's appointment patient:  [x]  Cancelled  []  Rescheduled appointment  []  No-show     Reason given by patient:  []  Patient ill  [x]  Conflicting appointment  []  No transportation    []  Conflict with work  []  No reason given  [x]  Other:     Comments:  Had procedure done at Dermatologist. Told to go home and rest      Phone call information:   []  Phone call made today to patient at _ time at number provided:      []  Patient answered, conversation as follows:    []  Patient did not answer, message left as follows:  []  Phone call not made today    Electronically signed by:  Ro Love [id number]

## 2021-04-27 ENCOUNTER — HOSPITAL ENCOUNTER (OUTPATIENT)
Dept: PHYSICAL THERAPY | Age: 86
Setting detail: THERAPIES SERIES
Discharge: HOME | End: 2021-04-27
Payer: MEDICARE

## 2021-04-27 PROCEDURE — 97110 THERAPEUTIC EXERCISES: CPT | Performed by: CHIROPRACTOR

## 2021-04-27 PROCEDURE — 97035 APP MDLTY 1+ULTRASOUND EA 15: CPT | Performed by: CHIROPRACTOR

## 2021-04-27 NOTE — FLOWSHEET NOTE
[facility] - Outpatient Rehabilitation and Therapy, [facility]  [address] [facility], [facility]  Phone: [phone]   Fax:     [phone]      Physical Therapy Treatment Note/ Progress Report:     Date:  2021    Patient Name:  Mckenzie Betancourt    :  1935  MRN: [id number]    Pertinent Medical History:Additional Pertinent Hx: Large Lymphocytic Leukemia, Arthritis, CAD, HTN, CABG    Medical/Treatment Diagnosis Information:  · Diagnosis: Trochanteric Bursitis of both hips  · Treatment Diagnosis: Pain in R side LB/buttock    Insurance/Certification information:   Medicare  Physician Information:  Dr. Suyapa Kern of care signed (Y/N): Faxed    Date of Patient follow up with Physician:      Progress Report: []  Yes  [x]  No     Date Range for reporting period:  Beginnin2021  Ending:      Progress report due (10 Rx/or 30 days whichever is less):     Recertification due (POC duration/ or 90 days whichever is less):      Visit # POC/Insurance Allowable Auth Needed   3/12 BOMN []Yes   [x]No     Latex Allergy:  [x]NO      []YES  Preferred Language for Healthcare:   [x]English       []Other:    Functional Scale:       Date assessed: at eval  Test: WOMAC  Score: 20    Pain level:  0/10     History of Injury:   Had progressively increasing pain of B posterior hips.  Had cortisone injections in each ~ 1 month with significant improvement of only the L hip pain    SUBJECTIVE:  C/o R side LBP/ upper buttock pain                            21- Reports only intermittent central LBP                             21 - Feeling OK at the moment,, but states he had LB pain during the night after increased                                          activity  yesterday  OBJECTIVE:   Observation:    Test measurements:      RESTRICTIONS/PRECAUTIONS:    Exercises/Interventions:     Therapeutic Ex (80975)   Min: Reps/Resistance Notes/CUES Nu-step                   #10          GSS/HSS 30 sec x 2         Ext in stand x10         Seated reclines x10         SLR 0# - 1x10   R/L    Bridges 1x10    S/L abd     Add squeeze x20    Hklg Abd Lime - x20    LTR x10    R/L                   Therapeutic Activity (85605) Min:           Reviewed home exer                         NMR re-education (65415)  Min:  CUES NEEDED             Manual Intervention (48182) Min:           B man LE traction 20 sec x 4                   Modalities  Min:          US to center LB X 8 min                Other Therapeutic Activities: Pt was educated on PT POC, Diagnosis, Prognosis, pathomechanics as well as frequency and duration of scheduling future physical therapy appointments. Time was also taken on this day to answer all patient questions and participation in PT. Reviewed appointment policy in detail with patient and patient verbalized understanding. Home Exercise Program: Patient was instructed in the following for HEP:     . Patient verbalized/demonstrated understanding and was issued written handout. Therapeutic Exercise and NMR EXR  [] (25593) Provided verbal/tactile cueing for activities related to strengthening, flexibility, endurance, ROM for improvements in LE, proximal hip, and core control with self care, mobility, lifting, ambulation.  [] (08640) Provided verbal/tactile cueing for activities related to improving balance, coordination, kinesthetic sense, posture, motor skill, proprioception  to assist with LE, proximal hip, and core control in self care, mobility, lifting, ambulation and eccentric single leg control. [address] and Therapeutic Activities:    [] (57335 or 85981) Provided verbal/tactile cueing for activities related to improving balance, coordination, kinesthetic sense, posture, motor skill, proprioception and motor activation to allow for proper function of core, proximal hip and LE with self care and ADLs and functional mobility.    [] Adjusted    Therapist goals for Patient:   Short Term Goals: To be achieved in: 2 weeks  1. Independent in HEP and progression per patient tolerance, in order to prevent re-injury. [] Progressing: [] Met: [] Not Met: [] Adjusted  2. Patient will have a decrease in pain to facilitate improvement in movement, function, and ADLs as indicated by Functional Deficits. [] Progressing: [] Met: [] Not Met: [] Adjusted    Long Term Goals: To be 10% or less for the LEFS to assist with reaching prior level of function. [] Progressing: [] Met: [] Not Met: [] Adjusted  2. Patient will demonstrate increased AROM  to allow for proper joint functioning as indicated by patients Functional Deficits. [] Progressing: [] Met: [] Not Met: [] Adjusted  3. Patient will demonstrate an increase in Strength to good proximal hip strength and control, within 5lb HHD in LE to allow for proper functional mobility as indicated by patients Functional Deficits. [] Progressing: [] Met: [] Not Met: [] Adjusted  4. Patient will return to usual functional activities without increased symptoms or restriction. [] Progressing: [] Met: [] Not Met: [] Adjusted  5. Be able to sleep > 4 hrs    [] Progressing: [] Met: [] Not Met: [] Adjusted     ASSESSMENT:  See eval    Treatment/Activity Tolerance:  [x] Patient tolerated treatment well [] Patient limited by fatique  [] Patient limited by pain  [] Patient limited by other medical complications  [] Other:     Overall Progression Towards Functional goals/ Treatment Progress Update:  [] Patient is progressing as expected towards functional goals listed. [] Progression is slowed due to complexities/Impairments listed. [] Progression has been slowed due to co-morbidities.   [x] Plan just implemented, too soon to assess goals progression <30days   [] Goals require adjustment due to lack of progress  [] Patient is not progressing as expected and requires additional follow up with physician  [] Other    Prognosis for POC: [x] Good [] Fair  [] Poor    Patient requires continued skilled intervention: [x] Yes  [] No         PLAN:   [x] Continue per plan of care [] Alter current plan (see comments)  [] Plan of care initiated [] Hold pending MD visit [] Discharge    Electronically signed by: Elida ARANA#[id number]    Note: If patient does not return for scheduled/recommended follow up visits, this note will serve as a discharge from care along with the most recent update on progress.

## 2021-04-30 ENCOUNTER — HOSPITAL ENCOUNTER (OUTPATIENT)
Dept: PHYSICAL THERAPY | Age: 86
Setting detail: THERAPIES SERIES
Discharge: HOME | End: 2021-04-30
Payer: MEDICARE

## 2021-04-30 PROCEDURE — 97110 THERAPEUTIC EXERCISES: CPT | Performed by: CHIROPRACTOR

## 2021-04-30 PROCEDURE — 97035 APP MDLTY 1+ULTRASOUND EA 15: CPT | Performed by: CHIROPRACTOR

## 2021-04-30 NOTE — FLOWSHEET NOTE
[facility] - Outpatient Rehabilitation and Therapy, [facility]  [address] [facility], [facility]  Phone: [phone]   Fax:     [phone]      Physical Therapy Treatment Note/ Progress Report:     Date:  2021    Patient Name:  Franchesca Rose    :  1935  MRN: [id number]    Pertinent Medical History:Additional Pertinent Hx: Large Lymphocytic Leukemia, Arthritis, CAD, HTN, CABG    Medical/Treatment Diagnosis Information:  · Diagnosis: Trochanteric Bursitis of both hips  · Treatment Diagnosis: Pain in R side LB/buttock    Insurance/Certification information:   Medicare  Physician Information:  Dr. Pastora Wright of care signed (Y/N): Faxed    Date of Patient follow up with Physician:      Progress Report: []  Yes  [x]  No     Date Range for reporting period:  Beginnin2021  Ending:      Progress report due (10 Rx/or 30 days whichever is less):     Recertification due (POC duration/ or 90 days whichever is less):      Visit # POC/Insurance Allowable Auth Needed    BOMN []Yes   [x]No     Latex Allergy:  [x]NO      []YES  Preferred Language for Healthcare:   [x]English       []Other:    Functional Scale:       Date assessed: at eval  Test: WOMAC  Score: 20    Pain level:  0/10     History of Injury:   Had progressively increasing pain of B posterior hips.  Had cortisone injections in each ~ 1 month with significant improvement of only the L hip pain    SUBJECTIVE:  C/o R side LBP/ upper buttock pain                            21- Reports only intermittent central LBP                             21 - Feeling OK at the moment,, but states he had LB pain during the night after increased                                          activity  Yesterday                            21 - Attributes slight increased pain yesterday to change in the weather  OBJECTIVE:   Observation:    Test measurements: RESTRICTIONS/PRECAUTIONS:    Exercises/Interventions:     Therapeutic Ex (11034)   Min: Reps/Resistance Notes/CUES        Nu-step                   #10 X 5 min         GSS/HSS 30 sec x 2    Hip abd in stand x10     R/L         Ext in stand x10         Seated reclines x10         SLR 0# - 1x10, 1x1x8  R/L    Bridges 1x10    S/L abd 0# - 1x10    R/L    Add squeeze x20    Hklg Abd Lime - x20    LTR x10    R/L                   Therapeutic Activity (72574) Min:           Reviewed home exer                         NMR re-education (44278)  Min:  CUES NEEDED             Manual Intervention (35887) Min:           B man LE traction 20 sec x 4                   Modalities  Min:          US to center LB X 8 min                Other Therapeutic Activities: Pt was educated on PT POC, Diagnosis, Prognosis, pathomechanics as well as frequency and duration of scheduling future physical therapy appointments. Time was also taken on this day to answer all patient questions and participation in PT. Reviewed appointment policy in detail with patient and patient verbalized understanding. Home Exercise Program: Patient was instructed in the following for HEP:     . Patient verbalized/demonstrated understanding and was issued written handout. Therapeutic Exercise and NMR EXR  [] (35525) Provided verbal/tactile cueing for activities related to strengthening, flexibility, endurance, ROM for improvements in LE, proximal hip, and core control with self care, mobility, lifting, ambulation.  [] (67105) Provided verbal/tactile cueing for activities related to improving balance, coordination, kinesthetic sense, posture, motor skill, proprioception  to assist with LE, proximal hip, and core control in self care, mobility, lifting, ambulation and eccentric single leg control.  02158    NMR and Therapeutic Activities:    [] (30334 or ) Provided verbal/tactile cueing for activities related to improving balance, coordination, ES(attended) (42061)     [] ES (un) (65083):   [] Vasopump (95039) [] Ionto (87655)   [] Other:    GOALS:  Patient stated goal: Be more active with less pain  [] Progressing: [] Met: [] Not Met: [] Adjusted    Therapist goals for Patient:   Short Term Goals: To be achieved in: 2 weeks  1. Independent in HEP and progression per patient tolerance, in order to prevent re-injury. [] Progressing: [] Met: [] Not Met: [] Adjusted  2. Patient will have a decrease in pain to facilitate improvement in movement, function, and ADLs as indicated by Functional Deficits. [] Progressing: [] Met: [] Not Met: [] Adjusted    Long Term Goals: To be 10% or less for the LEFS to assist with reaching prior level of function. [] Progressing: [] Met: [] Not Met: [] Adjusted  2. Patient will demonstrate increased AROM  to allow for proper joint functioning as indicated by patients Functional Deficits. [] Progressing: [] Met: [] Not Met: [] Adjusted  3. Patient will demonstrate an increase in Strength to good proximal hip strength and control, within 5lb HHD in LE to allow for proper functional mobility as indicated by patients Functional Deficits. [] Progressing: [] Met: [] Not Met: [] Adjusted  4. Patient will return to usual functional activities without increased symptoms or restriction. [] Progressing: [] Met: [] Not Met: [] Adjusted  5. Be able to sleep > 4 hrs    [] Progressing: [] Met: [] Not Met: [] Adjusted     ASSESSMENT:  See eval    Treatment/Activity Tolerance:  [x] Patient tolerated treatment well [] Patient limited by fatique  [] Patient limited by pain  [] Patient limited by other medical complications  [] Other:     Overall Progression Towards Functional goals/ Treatment Progress Update:  [] Patient is progressing as expected towards functional goals listed. [] Progression is slowed due to complexities/Impairments listed. [] Progression has been slowed due to co-morbidities.   [x] Plan just implemented, too soon to assess goals progression <30days   [] Goals require adjustment due to lack of progress  [] Patient is not progressing as expected and requires additional follow up with physician  [] Other    Prognosis for POC: [x] Good [] Fair  [] Poor    Patient requires continued skilled intervention: [x] Yes  [] No         PLAN:   [x] Continue per plan of care [] Alter current plan (see comments)  [] Plan of care initiated [] Hold pending MD visit [] Discharge    Electronically signed by: Elida Concepcion #[id number]    Note: If patient does not return for scheduled/recommended follow up visits, this note will serve as a discharge from care along with the most recent update on progress.

## 2021-05-04 ENCOUNTER — HOSPITAL ENCOUNTER (OUTPATIENT)
Dept: PHYSICAL THERAPY | Age: 86
Setting detail: THERAPIES SERIES
Discharge: HOME | End: 2021-05-04
Payer: MEDICARE

## 2021-05-04 PROCEDURE — 97110 THERAPEUTIC EXERCISES: CPT | Performed by: CHIROPRACTOR

## 2021-05-04 PROCEDURE — 97035 APP MDLTY 1+ULTRASOUND EA 15: CPT | Performed by: CHIROPRACTOR

## 2021-05-04 NOTE — FLOWSHEET NOTE
[facility] - Outpatient Rehabilitation and Therapy, [place]  [address] [place] [address]  Phone: [phone]   Fax:     [phone]      Physical Therapy Treatment Note/ Progress Report:     Date:  2021    Patient Name:  Rea Quiroz    :  1935  MRN: [id number]    Pertinent Medical History:Additional Pertinent Hx: Large Lymphocytic Leukemia, Arthritis, CAD, HTN, CABG    Medical/Treatment Diagnosis Information:  · Diagnosis: Trochanteric Bursitis of both hips  · Treatment Diagnosis: Pain in R side LB/buttock    Insurance/Certification information:   Medicare  Physician Information:  Dr. Kvng Dunham of care signed (Y/N): Faxed    Date of Patient follow up with Physician:      Progress Report: []  Yes  [x]  No     Date Range for reporting period:  Beginnin2021  Ending:      Progress report due (10 Rx/or 30 days whichever is less):     Recertification due (POC duration/ or 90 days whichever is less):      Visit # POC/Insurance Allowable Auth Needed    BOMN []Yes   [x]No     Latex Allergy:  [x]NO      []YES  Preferred Language for Healthcare:   [x]English       []Other:    Functional Scale:       Date assessed: at eval  Test: WOMAC  Score: 20    Pain level:  0-1/10     History of Injury:   Had progressively increasing pain of B posterior hips.  Had cortisone injections in each ~ 1 month with significant improvement of only the L hip pain    SUBJECTIVE:  C/o R side LBP/ upper buttock pain                            21- Reports only intermittent central LBP                             21 - Feeling OK at the moment,, but states he had LB pain during the night after increased                                          activity  Yesterday                            21 - Attributes slight increased pain yesterday to change in the weather                              - Again attributes his minimal soreness (mostly at night)  to his arthritis/ weather  OBJECTIVE:   Observation:    Test measurements:      RESTRICTIONS/PRECAUTIONS:    Exercises/Interventions:     Therapeutic Ex (06160)   Min: Reps/Resistance Notes/CUES        Nu-step                   #10 X 5 min         GSS/HSS 30 sec x 2    Hip abd in stand x10     R/L         FAQ 22# - 1x10    B    HS Curl 22# - 1x10    B         Ext in stand x10         Seated reclines x10         SLR 0# - 2x10   R/L    Bridges 1x10    S/L abd 0# - 1x10    R/L    Add squeeze x20    Hklg Abd Lime - x20    LTR x10    R/L                   Therapeutic Activity (45334) Min:           Reviewed home exer                         NMR re-education (46977)  Min:  CUES NEEDED             Manual Intervention (92806) Min:           B man LE traction 20 sec x 4                   Modalities  Min:          US to center LB X 8 min                Other Therapeutic Activities: Pt was educated on PT POC, Diagnosis, Prognosis, pathomechanics as well as frequency and duration of scheduling future physical therapy appointments. Time was also taken on this day to answer all patient questions and participation in PT. Reviewed appointment policy in detail with patient and patient verbalized understanding. Home Exercise Program: Patient was instructed in the following for HEP:     . Patient verbalized/demonstrated understanding and was issued written handout. Therapeutic Exercise and NMR EXR  [] (31096) Provided verbal/tactile cueing for activities related to strengthening, flexibility, endurance, ROM for improvements in LE, proximal hip, and core control with self care, mobility, lifting, ambulation.  [] (68868) Provided verbal/tactile cueing for activities related to improving balance, coordination, kinesthetic sense, posture, motor skill, proprioception  to assist with LE, proximal hip, and core control in self care, mobility, lifting, ambulation and eccentric single leg control. [address] and Therapeutic Activities:    [] (49523 or 69318) Provided verbal/tactile cueing for activities related to improving balance, coordination, kinesthetic sense, posture, motor skill, proprioception and motor activation to allow for proper function of core, proximal hip and LE with self care and ADLs and functional mobility.   [] (64388) Gait Re-education- Provided training and instruction to the patient for proper LE, core and proximal hip recruitment and positioning and eccentric body weight control with ambulation re-education including up and down stairs     Home Exercise Program:    [x] (66725) Reviewed/Progressed HEP activities related to strengthening, flexibility, endurance, ROM of core, proximal hip and LE for functional self-care, mobility, lifting and ambulation/stair navigation   [] (97957)Reviewed/Progressed HEP activities related to improving balance, coordination, kinesthetic sense, posture, motor skill, proprioception of core, proximal hip and LE for self care, mobility, lifting, and ambulation/stair navigation      Manual Treatments:  PROM / STM / Oscillations-Mobs:  G-I, II, III, IV (PA's, Inf., Post.)  [] (20840) Provided manual therapy to mobilize LE, proximal hip and/or LS spine soft tissue/joints for the purpose of modulating pain, promoting relaxation,  increasing ROM, reducing/eliminating soft tissue swelling/inflammation/restriction, improving soft tissue extensibility and allowing for proper ROM for normal function with self care, mobility, lifting and ambulation.      If [facility] Please Indicate Time In/Out  CPT Code Time in Time out                         Charges:  Timed Code Treatment Minutes: 40   Total Treatment Minutes: 40      [] EVAL (LOW) 85809 (typically 20 minutes face-to-face)  [] EVAL (MOD) 27001 (typically 30 minutes face-to-face)  [] EVAL (HIGH) 50782 (typically 45 minutes face-to-face)  [] RE-EVAL     [x] RY(38734) x 2   [] Dry needle 1 or 2 Muscles (04466)  [] NMR Progression is slowed due to complexities/Impairments listed. [] Progression has been slowed due to co-morbidities. [x] Plan just implemented, too soon to assess goals progression <30days   [] Goals require adjustment due to lack of progress  [] Patient is not progressing as expected and requires additional follow up with physician  [] Other    Prognosis for POC: [x] Good [] Fair  [] Poor    Patient requires continued skilled intervention: [x] Yes  [] No         PLAN:   [x] Continue per plan of care [] Alter current plan (see comments)  [] Plan of care initiated [] Hold pending MD visit [] Discharge    Electronically signed by: Zurdo WIGGINS#[id number]    Note: If patient does not return for scheduled/recommended follow up visits, this note will serve as a discharge from care along with the most recent update on progress.

## 2021-05-07 ENCOUNTER — HOSPITAL ENCOUNTER (OUTPATIENT)
Dept: PHYSICAL THERAPY | Age: 86
Setting detail: THERAPIES SERIES
Discharge: HOME | End: 2021-05-07
Payer: MEDICARE

## 2021-05-07 PROCEDURE — 97110 THERAPEUTIC EXERCISES: CPT | Performed by: CHIROPRACTOR

## 2021-05-07 PROCEDURE — 97035 APP MDLTY 1+ULTRASOUND EA 15: CPT | Performed by: CHIROPRACTOR

## 2021-05-07 NOTE — FLOWSHEET NOTE
[facility] - Outpatient Rehabilitation and Therapy, [facility]  [address] [facility], [facility]  Phone: [phone]   Fax:     [phone]      Physical Therapy Treatment Note/ Progress Report:     Date:  2021    Patient Name:  Heidy Burdick    :  1935  MRN: [id number]    Pertinent Medical History:Additional Pertinent Hx: Large Lymphocytic Leukemia, Arthritis, CAD, HTN, CABG    Medical/Treatment Diagnosis Information:  · Diagnosis: Trochanteric Bursitis of both hips  · Treatment Diagnosis: Pain in R side LB/buttock    Insurance/Certification information:   Medicare  Physician Information:  Dr. Chica Mcdaniels of care signed (Y/N): Faxed    Date of Patient follow up with Physician:      Progress Report: []  Yes  [x]  No     Date Range for reporting period:  Beginnin2021  Ending:      Progress report due (10 Rx/or 30 days whichever is less):     Recertification due (POC duration/ or 90 days whichever is less):      Visit # POC/Insurance Allowable Auth Needed    BOMN []Yes   [x]No     Latex Allergy:  [x]NO      []YES  Preferred Language for Healthcare:   [x]English       []Other:    Functional Scale:       Date assessed: at eval  Test: WOMAC  Score: 20    Pain level:  0-1/10     History of Injury:   Had progressively increasing pain of B posterior hips.  Had cortisone injections in each ~ 1 month with significant improvement of only the L hip pain    SUBJECTIVE:  C/o R side LBP/ upper buttock pain                            21- Reports only intermittent central LBP                             21 - Feeling OK at the moment,, but states he had LB pain during the night after increased                                          activity  Yesterday                            21 - Attributes slight increased pain yesterday to change in the weather                              - Again attributes his minimal Progression is slowed due to complexities/Impairments listed. [] Progression has been slowed due to co-morbidities. [x] Plan just implemented, too soon to assess goals progression <30days   [] Goals require adjustment due to lack of progress  [] Patient is not progressing as expected and requires additional follow up with physician  [] Other    Prognosis for POC: [x] Good [] Fair  [] Poor    Patient requires continued skilled intervention: [x] Yes  [] No         PLAN:   [x] Continue per plan of care [] Alter current plan (see comments)  [] Plan of care initiated [] Hold pending MD visit [] Discharge    Electronically signed by: Mere ROWE#[id number]    Note: If patient does not return for scheduled/recommended follow up visits, this note will serve as a discharge from care along with the most recent update on progress.

## 2021-05-11 ENCOUNTER — HOSPITAL ENCOUNTER (OUTPATIENT)
Dept: PHYSICAL THERAPY | Age: 86
Setting detail: THERAPIES SERIES
Discharge: HOME | End: 2021-05-11
Payer: MEDICARE

## 2021-05-11 PROCEDURE — 97110 THERAPEUTIC EXERCISES: CPT | Performed by: CHIROPRACTOR

## 2021-05-11 PROCEDURE — 97035 APP MDLTY 1+ULTRASOUND EA 15: CPT | Performed by: CHIROPRACTOR

## 2021-05-11 NOTE — FLOWSHEET NOTE
[facility] - Outpatient Rehabilitation and Therapy, [facility]  [address] [facility], [facility]  Phone: [phone]   Fax:     [phone]      Physical Therapy Treatment Note/ Progress Report:     Date:  2021    Patient Name:  Franchesca Rose    :  1935  MRN: [id number]    Pertinent Medical History:Additional Pertinent Hx: Large Lymphocytic Leukemia, Arthritis, CAD, HTN, CABG    Medical/Treatment Diagnosis Information:  · Diagnosis: Trochanteric Bursitis of both hips  · Treatment Diagnosis: Pain in R side LB/buttock    Insurance/Certification information:   Medicare  Physician Information:  Dr. Pastora Wright of care signed (Y/N): Faxed    Date of Patient follow up with Physician:      Progress Report: []  Yes  [x]  No     Date Range for reporting period:  Beginnin2021  Ending:      Progress report due (10 Rx/or 30 days whichever is less):     Recertification due (POC duration/ or 90 days whichever is less):      Visit # POC/Insurance Allowable Auth Needed    BOMN []Yes   [x]No     Latex Allergy:  [x]NO      []YES  Preferred Language for Healthcare:   [x]English       []Other:    Functional Scale:       Date assessed: at eval  Test: WOMAC  Score: 20    Pain level:  0/10     History of Injury:   Had progressively increasing pain of B posterior hips.  Had cortisone injections in each ~ 1 month with significant improvement of only the L hip pain    SUBJECTIVE:  C/o R side LBP/ upper buttock pain                            21- Reports only intermittent central LBP                             21 - Feeling OK at the moment,, but states he had LB pain during the night after increased                                          activity  Yesterday                            21 - Attributes slight increased pain yesterday to change in the weather                              - Again attributes his minimal mobility, lifting, ambulation and eccentric single leg control. [address] and Therapeutic Activities:    [] (06995 or 39821) Provided verbal/tactile cueing for activities related to improving balance, coordination, kinesthetic sense, posture, motor skill, proprioception and motor activation to allow for proper function of core, proximal hip and LE with self care and ADLs and functional mobility.   [] (86968) Gait Re-education- Provided training and instruction to the patient for proper LE, core and proximal hip recruitment and positioning and eccentric body weight control with ambulation re-education including up and down stairs     Home Exercise Program:    [x] (47207) Reviewed/Progressed HEP activities related to strengthening, flexibility, endurance, ROM of core, proximal hip and LE for functional self-care, mobility, lifting and ambulation/stair navigation   [] (42396)Reviewed/Progressed HEP activities related to improving balance, coordination, kinesthetic sense, posture, motor skill, proprioception of core, proximal hip and LE for self care, mobility, lifting, and ambulation/stair navigation      Manual Treatments:  PROM / STM / Oscillations-Mobs:  G-I, II, III, IV (PA's, Inf., Post.)  [] (91086) Provided manual therapy to mobilize LE, proximal hip and/or LS spine soft tissue/joints for the purpose of modulating pain, promoting relaxation,  increasing ROM, reducing/eliminating soft tissue swelling/inflammation/restriction, improving soft tissue extensibility and allowing for proper ROM for normal function with self care, mobility, lifting and ambulation.      If [facility] Please Indicate Time In/Out  CPT Code Time in Time out                         Charges:  Timed Code Treatment Minutes: 40   Total Treatment Minutes: 40      [] EVAL (LOW) 20046 (typically 20 minutes face-to-face)  [] EVAL (MOD) 59645 (typically 30 minutes face-to-face)  [] EVAL (HIGH) 87991 (typically 45 minutes face-to-face)  [] RE-EVAL     [x] KZ(76710) x 2   [] Dry needle 1 or 2 Muscles (15496)  [] NMR (68086) x     [] Dry needle 3+ Muscles (58688)  [] Manual (66840) x     [x] Ultrasound (79352) x  [] TA (76228) x     [] Mech Traction (59463)  [] ES(attended) (24767)     [] ES (un) (03794):   [] Vasopump (03095) [] Ionto (32516)   [] Other:    GOALS:  Patient stated goal: Be more active with less pain  [] Progressing: [] Met: [] Not Met: [] Adjusted    Therapist goals for Patient:   Short Term Goals: To be achieved in: 2 weeks  1. Independent in HEP and progression per patient tolerance, in order to prevent re-injury. [] Progressing: [] Met: [] Not Met: [] Adjusted  2. Patient will have a decrease in pain to facilitate improvement in movement, function, and ADLs as indicated by Functional Deficits. [] Progressing: [] Met: [] Not Met: [] Adjusted    Long Term Goals: To be 10% or less for the LEFS to assist with reaching prior level of function. [] Progressing: [] Met: [] Not Met: [] Adjusted  2. Patient will demonstrate increased AROM  to allow for proper joint functioning as indicated by patients Functional Deficits. [] Progressing: [] Met: [] Not Met: [] Adjusted  3. Patient will demonstrate an increase in Strength to good proximal hip strength and control, within 5lb HHD in LE to allow for proper functional mobility as indicated by patients Functional Deficits. [] Progressing: [] Met: [] Not Met: [] Adjusted  4. Patient will return to usual functional activities without increased symptoms or restriction. [] Progressing: [] Met: [] Not Met: [] Adjusted  5.  Be able to sleep > 4 hrs    [] Progressing: [] Met: [] Not Met: [] Adjusted     ASSESSMENT:  See eval    Treatment/Activity Tolerance:  [x] Patient tolerated treatment well [] Patient limited by fatique  [] Patient limited by pain  [] Patient limited by other medical complications  [] Other:     Overall Progression Towards Functional goals/ Treatment Progress Update:  [] Patient is progressing as expected towards functional goals listed. [] Progression is slowed due to complexities/Impairments listed. [] Progression has been slowed due to co-morbidities. [x] Plan just implemented, too soon to assess goals progression <30days   [] Goals require adjustment due to lack of progress  [] Patient is not progressing as expected and requires additional follow up with physician  [] Other    Prognosis for POC: [x] Good [] Fair  [] Poor    Patient requires continued skilled intervention: [x] Yes  [] No         PLAN: Possible DC after next visit  [x] Continue per plan of care [] Alter current plan (see comments)  [] Plan of care initiated [] Hold pending MD visit [] Discharge    Electronically signed by: Simba Becerra #[id number]    Note: If patient does not return for scheduled/recommended follow up visits, this note will serve as a discharge from care along with the most recent update on progress.

## 2021-05-13 ENCOUNTER — HOSPITAL ENCOUNTER (OUTPATIENT)
Dept: PHYSICAL THERAPY | Age: 86
Setting detail: THERAPIES SERIES
Discharge: HOME | End: 2021-05-13
Payer: MEDICARE

## 2021-05-13 PROCEDURE — 97035 APP MDLTY 1+ULTRASOUND EA 15: CPT | Performed by: CHIROPRACTOR

## 2021-05-13 PROCEDURE — 97110 THERAPEUTIC EXERCISES: CPT | Performed by: CHIROPRACTOR

## 2021-05-13 NOTE — FLOWSHEET NOTE
[facility] - Outpatient Rehabilitation and Therapy, [facility]  [address] [facility] [place], [facility]  Phone: [phone]   Fax:     [phone]      Physical Therapy Treatment Note/ Progress Report:     Date:  2021    Patient Name:  Emma Garcia    :  1935  MRN: [id number]    Pertinent Medical History:Additional Pertinent Hx: Large Lymphocytic Leukemia, Arthritis, CAD, HTN, CABG    Medical/Treatment Diagnosis Information:  · Diagnosis: Trochanteric Bursitis of both hips  · Treatment Diagnosis: Pain in R side LB/buttock    Insurance/Certification information:   Medicare  Physician Information:  Dr. Bianca Rodríguez of care signed (Y/N): Faxed    Date of Patient follow up with Physician:      Progress Report: []  Yes  [x]  No     Date Range for reporting period:  Beginnin2021  Ending:      Progress report due (10 Rx/or 30 days whichever is less):     Recertification due (POC duration/ or 90 days whichever is less):      Visit # POC/Insurance Allowable Auth Needed    BOMN []Yes   [x]No     Latex Allergy:  [x]NO      []YES  Preferred Language for Healthcare:   [x]English       []Other:    Functional Scale:       Date assessed: at eval  Test: WOMAC  Score: 11    Pain level:  0/10     History of Injury:   Had progressively increasing pain of B posterior hips.  Had cortisone injections in each ~ 1 month with significant improvement of only the L hip pain    SUBJECTIVE:  C/o R side LBP/ upper buttock pain                            21- Reports only intermittent central LBP                             21 - Feeling OK at the moment,, but states he had LB pain during the night after increased                                          activity  Yesterday                            21 - Attributes slight increased pain yesterday to change in the weather                              - Again attributes his minimal soreness (mostly at night)  to his arthritis/ weather                             5/11/21 - Only c/o legs feeling tired                             5/13/21 - Again c/o fatigue in LE/s with increased walking  OBJECTIVE:   Observation:    Test measurements:  Hip strength/ ROM improved to WNL                                         HS flexibility improved to 55 degrees B    RESTRICTIONS/PRECAUTIONS:    Exercises/Interventions:     Therapeutic Ex (62003)   Min: Reps/Resistance Notes/CUES        Nu-step                   #10 X 5 min         GSS/HSS 30 sec x 2    Hip abd in stand 1# - 2x10     R/L         FAQ 22# - 2x10    B    HS Curl 22# - 2x10    B         Ext in stand x10         Seated reclines x10         SLR 0# - 2x10   R/L    Bridges 1x10    S/L abd 0# - 1x10    R/L    Add squeeze x20    Hklg Abd Lime - x20    LTR x10    R/L                   Therapeutic Activity (78397) Min:           Reviewed home exer                         NMR re-education (61978)  Min:  CUES NEEDED             Manual Intervention (68824) Min:           B man LE traction 20 sec x 4                   Modalities  Min:          US to center LB X 8 min                Other Therapeutic Activities: Pt was educated on PT POC, Diagnosis, Prognosis, pathomechanics as well as frequency and duration of scheduling future physical therapy appointments. Time was also taken on this day to answer all patient questions and participation in PT. Reviewed appointment policy in detail with patient and patient verbalized understanding. Home Exercise Program: Patient was instructed in the following for HEP:     . Patient verbalized/demonstrated understanding and was issued written handout.       Therapeutic Exercise and NMR EXR  [] (99415) Provided verbal/tactile cueing for activities related to strengthening, flexibility, endurance, ROM for improvements in LE, proximal hip, and core control with self care, mobility, lifting, ambulation.  [] (28064) Provided verbal/tactile cueing for activities related to improving balance, coordination, kinesthetic sense, posture, motor skill, proprioception  to assist with LE, proximal hip, and core control in self care, mobility, lifting, ambulation and eccentric single leg control. [address] and Therapeutic Activities:    [] (17213 or 94037) Provided verbal/tactile cueing for activities related to improving balance, coordination, kinesthetic sense, posture, motor skill, proprioception and motor activation to allow for proper function of core, proximal hip and LE with self care and ADLs and functional mobility.   [] (49954) Gait Re-education- Provided training and instruction to the patient for proper LE, core and proximal hip recruitment and positioning and eccentric body weight control with ambulation re-education including up and down stairs     Home Exercise Program:    [x] (28811) Reviewed/Progressed HEP activities related to strengthening, flexibility, endurance, ROM of core, proximal hip and LE for functional self-care, mobility, lifting and ambulation/stair navigation   [] (04411)Reviewed/Progressed HEP activities related to improving balance, coordination, kinesthetic sense, posture, motor skill, proprioception of core, proximal hip and LE for self care, mobility, lifting, and ambulation/stair navigation      Manual Treatments:  PROM / STM / Oscillations-Mobs:  G-I, II, III, IV (PA's, Inf., Post.)  [] (22089) Provided manual therapy to mobilize LE, proximal hip and/or LS spine soft tissue/joints for the purpose of modulating pain, promoting relaxation,  increasing ROM, reducing/eliminating soft tissue swelling/inflammation/restriction, improving soft tissue extensibility and allowing for proper ROM for normal function with self care, mobility, lifting and ambulation.      If [facility] Please Indicate Time In/Out  CPT Code Time in Time out                         Charges:  Timed Code Treatment Minutes: 40   Total Treatment Minutes: 40      [] EVAL (LOW) 23157 (typically 20 minutes face-to-face)  [] EVAL (MOD) 76258 (typically 30 minutes face-to-face)  [] EVAL (HIGH) 19985 (typically 45 minutes face-to-face)  [] RE-EVAL     [x] VB(54037) x 2   [] Dry needle 1 or 2 Muscles (30003)  [] NMR (80964) x     [] Dry needle 3+ Muscles (32255)  [] Manual (38632) x     [x] Ultrasound (65162) x  [] TA (97488) x     [] Mech Traction (75706)  [] ES(attended) (09119)     [] ES (un) (39065):   [] Vasopump (24728) [] Ionto (43629)   [] Other:    GOALS:  Patient stated goal: Be more active with less pain  [] Progressing: [] Met: [] Not Met: [] Adjusted    Therapist goals for Patient:   Short Term Goals: To be achieved in: 2 weeks  1. Independent in HEP and progression per patient tolerance, in order to prevent re-injury. [] Progressing: [] Met: [] Not Met: [] Adjusted  2. Patient will have a decrease in pain to facilitate improvement in movement, function, and ADLs as indicated by Functional Deficits. [] Progressing: [] Met: [] Not Met: [] Adjusted    Long Term Goals: To be 10% or less for the LEFS to assist with reaching prior level of function. [] Progressing: [] Met: [] Not Met: [] Adjusted  2. Patient will demonstrate increased AROM  to allow for proper joint functioning as indicated by patients Functional Deficits. [] Progressing: [] Met: [] Not Met: [] Adjusted  3. Patient will demonstrate an increase in Strength to good proximal hip strength and control, within 5lb HHD in LE to allow for proper functional mobility as indicated by patients Functional Deficits. [] Progressing: [] Met: [] Not Met: [] Adjusted  4. Patient will return to usual functional activities without increased symptoms or restriction. [] Progressing: [] Met: [] Not Met: [] Adjusted  5.  Be able to sleep > 4 hrs    [] Progressing: [] Met: [] Not Met: [] Adjusted     ASSESSMENT:  See eval    Treatment/Activity Tolerance:  [x] Patient tolerated treatment well [] Patient limited by fatique  [] Patient limited by pain  [] Patient limited by other medical complications  [] Other:     Overall Progression Towards Functional goals/ Treatment Progress Update:  [] Patient is progressing as expected towards functional goals listed. [] Progression is slowed due to complexities/Impairments listed. [] Progression has been slowed due to co-morbidities. [x] Plan just implemented, too soon to assess goals progression <30days   [] Goals require adjustment due to lack of progress  [] Patient is not progressing as expected and requires additional follow up with physician  [] Other    Prognosis for POC: [x] Good [] Fair  [] Poor    Patient requires continued skilled intervention: [] Yes  [x] No         PLAN:  DC to HEP  [] Continue per plan of care [] Alter current plan (see comments)  [] Plan of care initiated [] Hold pending MD visit [x] Discharge    Electronically signed by: Annamarie RAMIRES#[id number]    Note: If patient does not return for scheduled/recommended follow up visits, this note will serve as a discharge from care along with the most recent update on progress.

## 2021-05-13 NOTE — DISCHARGE SUMMARY
[place] [facility], [facility]  [address] [facility], [facility]  Phone: [phone]   Fax:     [phone]      Physical Therapy Discharge Summary    Dear Mahogany Hillman    We had the pleasure of treating the following patient for physical therapy services at [address]. A summary of our findings can be found in the discharge summary below. This includes our final assessment. If you have any questions or concerns regarding these findings, please do not hesitate to contact me at the office phone number checked above.   Thank you for the referral.       Physician Signature:________________________________Date:__________________  By signing above, therapists plan is approved by physician          Patient: Barbra De Luna   : 1935   MRN: [id number]  Referring Physician:        Evaluation Date: 2021        Medical Diagnosis Information:  · Trochanteric Bursitis of both hips  ·  (Pain in R side LB and buttock)     Insurance/Certification information:      Date Range of Treatment: 4/15/21 - 21  Total visits:  8      Functional Outcomes Measure: at discharge  Test: [facility]  Score: 11    SUBJECTIVE:   C/o fatigue in LE's with walking, but no c/o pain     Medicare :  $657.05    Pain Scale:0/10    Type: []Constant   []Intermitment  []Radiating []Localized  []other:     Functional Limitations: []Sitting []Standing []Walking    []Squatting []Stairs            []ADL's  []Driving []Sports/Recreation  []Other:      · OBJECTIVE:  Hip strength/ ROM improved to WNL                                        HS flexibility improved to 55 degrees B        ASSESSMENT:Should do well continuing with HEP      Response to Treatment:   [x]Patient met all goals and has responded well to treatment and will continue HEP   []Patient should continue to improve in reasonable time if they continue HEP   []Patient has plateaued and is no longer responding to skilled PT intervention    []Patient is getting worse and would benefit from return to referring MD   []Patient unable to adhere to initial POC     PLAN:   Patient will need to maintain their HEP in order to prevent re-occurrence.       Reason for Discharge:  [x] Goals Met   [] Patient did not return after initial evaluation   [] Progress Plateaued  [] Missed _____ scheduled appointments   [] No insurance coverage [] Patient terminated therapy   [] MD discharged from PT [] Financial Limitations  [] Other:      Electronically signed by:  Karri Cook, PT #`[id number]

## 2023-05-08 RX ORDER — TORSEMIDE 10 MG/1
10 TABLET ORAL DAILY
COMMUNITY
End: 2023-05-08

## 2023-05-08 RX ORDER — ROSUVASTATIN CALCIUM 5 MG/1
5 TABLET, COATED ORAL DAILY
COMMUNITY
End: 2023-05-08

## 2023-05-08 RX ORDER — ERGOCALCIFEROL 1.25 MG/1
50000 CAPSULE ORAL WEEKLY
COMMUNITY

## 2023-05-08 RX ORDER — TORSEMIDE 10 MG/1
10 TABLET ORAL DAILY
COMMUNITY

## 2023-05-08 RX ORDER — SENNA PLUS 8.6 MG/1
1 TABLET ORAL DAILY
COMMUNITY

## 2023-05-08 RX ORDER — DOXYCYCLINE HYCLATE 100 MG/1
TABLET, DELAYED RELEASE ORAL
COMMUNITY
End: 2023-05-08

## 2023-05-08 RX ORDER — TEMAZEPAM 15 MG/1
15 CAPSULE ORAL NIGHTLY PRN
COMMUNITY

## 2023-05-08 RX ORDER — ROPINIROLE 2 MG/1
2 TABLET, FILM COATED ORAL 3 TIMES DAILY
COMMUNITY
End: 2023-05-08

## 2023-05-08 RX ORDER — METOPROLOL SUCCINATE 25 MG/1
25 TABLET, EXTENDED RELEASE ORAL NIGHTLY
COMMUNITY

## 2023-05-08 RX ORDER — SENNOSIDES 8.6 MG
650 CAPSULE ORAL EVERY 8 HOURS PRN
COMMUNITY
End: 2023-05-08

## 2023-05-08 RX ORDER — ROPINIROLE 2 MG/1
2 TABLET, FILM COATED ORAL 2 TIMES DAILY
COMMUNITY

## 2023-05-08 RX ORDER — GABAPENTIN 100 MG/1
200 CAPSULE ORAL DAILY
COMMUNITY

## 2023-05-11 RX ORDER — POLYETHYLENE GLYCOL 3350 17 G/17G
17 POWDER, FOR SOLUTION ORAL DAILY PRN
COMMUNITY

## 2023-05-11 RX ORDER — CYCLOSPORINE 100 MG/1
100 CAPSULE, GELATIN COATED ORAL DAILY
COMMUNITY

## 2023-05-16 NOTE — PRE-PROCEDURE INSTRUCTIONS
[address] [facility]  [phone]        Pre-Op Phone Call:     Patient Name: Mendez Mccray     Telephone Information:   Mobile [phone]     Home phone:  [phone]    Surgery Time:   11:10 AM     Arrival Time:  0940     Left extended Message:  No     Message left with: Spoke with patient      Recent change in health status:  No     Advised of transportation/ policy:  Yes     NPO policy reviewed: Yes     Advised to take morning heart/blood pressure medications with sips of water morning of surgery? Yes     Instructed to bring eye drops, photo identification, and insurance card day of surgery? Yes     Advised to wear short sleeved button down shirt (no T-shirt underneath):  Yes     Advised not to wear jewelry, hairpins, or pantyhose day of surgery? Yes     Advised not to wear make-up and to wash face day of surgery? Yes    Remarks: Spoke with patient- answered all questions. Verbalized understanding of preop instructions.          Electronically signed by:  Deloris Nielsen RN at 5/16/2023 10:00 AM

## 2023-05-17 ENCOUNTER — ANESTHESIA EVENT (OUTPATIENT)
Dept: SURGERY | Age: 88
End: 2023-05-17
Payer: MEDICARE

## 2023-05-17 ENCOUNTER — PREP FOR PROCEDURE (OUTPATIENT)
Dept: OPHTHALMOLOGY | Age: 88
End: 2023-05-17

## 2023-05-17 RX ORDER — KETOROLAC TROMETHAMINE 5 MG/ML
1 SOLUTION OPHTHALMIC SEE ADMIN INSTRUCTIONS
Status: CANCELLED | OUTPATIENT
Start: 2023-05-17

## 2023-05-17 RX ORDER — BETAMETHASONE VALERATE 0.1 %
LOTION (ML) TOPICAL 2 TIMES DAILY
COMMUNITY

## 2023-05-17 RX ORDER — SODIUM CHLORIDE 9 MG/ML
INJECTION, SOLUTION INTRAVENOUS PRN
Status: CANCELLED | OUTPATIENT
Start: 2023-05-17

## 2023-05-17 RX ORDER — SODIUM CHLORIDE 0.9 % (FLUSH) 0.9 %
5-40 SYRINGE (ML) INJECTION EVERY 12 HOURS SCHEDULED
Status: CANCELLED | OUTPATIENT
Start: 2023-05-17

## 2023-05-17 RX ORDER — CIPROFLOXACIN HYDROCHLORIDE 3.5 MG/ML
1 SOLUTION/ DROPS TOPICAL SEE ADMIN INSTRUCTIONS
Status: CANCELLED | OUTPATIENT
Start: 2023-05-17

## 2023-05-17 RX ORDER — TETRACAINE HYDROCHLORIDE 5 MG/ML
1 SOLUTION OPHTHALMIC SEE ADMIN INSTRUCTIONS
Status: CANCELLED | OUTPATIENT
Start: 2023-05-17

## 2023-05-17 RX ORDER — SODIUM CHLORIDE 0.9 % (FLUSH) 0.9 %
5-40 SYRINGE (ML) INJECTION PRN
Status: CANCELLED | OUTPATIENT
Start: 2023-05-17

## 2023-05-17 RX ORDER — PHENYLEPHRINE HCL 2.5 %
1 DROPS OPHTHALMIC (EYE) SEE ADMIN INSTRUCTIONS
Status: CANCELLED | OUTPATIENT
Start: 2023-05-17

## 2023-05-17 RX ORDER — TROPICAMIDE 10 MG/ML
1 SOLUTION/ DROPS OPHTHALMIC SEE ADMIN INSTRUCTIONS
Status: CANCELLED | OUTPATIENT
Start: 2023-05-17

## 2023-05-18 ENCOUNTER — HOSPITAL ENCOUNTER (OUTPATIENT)
Age: 88
Setting detail: OUTPATIENT SURGERY
Discharge: HOME | End: 2023-05-18
Attending: OPHTHALMOLOGY | Admitting: OPHTHALMOLOGY
Payer: MEDICARE

## 2023-05-18 ENCOUNTER — ANESTHESIA (OUTPATIENT)
Dept: SURGERY | Age: 88
End: 2023-05-18
Payer: MEDICARE

## 2023-05-18 VITALS
SYSTOLIC BLOOD PRESSURE: 160 MMHG | RESPIRATION RATE: 18 BRPM | BODY MASS INDEX: 24.64 KG/M2 | TEMPERATURE: 97.5 F | HEIGHT: 67 IN | HEART RATE: 64 BPM | DIASTOLIC BLOOD PRESSURE: 62 MMHG | WEIGHT: 157 LBS | OXYGEN SATURATION: 98 %

## 2023-05-18 PROCEDURE — 6370000000 HC RX 637 (ALT 250 FOR IP): Performed by: OPHTHALMOLOGY

## 2023-05-18 PROCEDURE — V2632 POST CHMBR INTRAOCULAR LENS: HCPCS | Performed by: OPHTHALMOLOGY

## 2023-05-18 PROCEDURE — 2709999900 HC NON-CHARGEABLE SUPPLY: Performed by: OPHTHALMOLOGY

## 2023-05-18 PROCEDURE — 3700000000 HC ANESTHESIA ATTENDED CARE: Performed by: OPHTHALMOLOGY

## 2023-05-18 PROCEDURE — 7100000010 HC PHASE II RECOVERY - FIRST 15 MIN: Performed by: OPHTHALMOLOGY

## 2023-05-18 PROCEDURE — 2580000003 HC RX 258: Performed by: ANESTHESIOLOGY

## 2023-05-18 PROCEDURE — 2500000003 HC RX 250 WO HCPCS: Performed by: OPHTHALMOLOGY

## 2023-05-18 PROCEDURE — 3700000001 HC ADD 15 MINUTES (ANESTHESIA): Performed by: OPHTHALMOLOGY

## 2023-05-18 PROCEDURE — 6360000002 HC RX W HCPCS

## 2023-05-18 PROCEDURE — 3600000014 HC SURGERY LEVEL 4 ADDTL 15MIN: Performed by: OPHTHALMOLOGY

## 2023-05-18 PROCEDURE — 3600000004 HC SURGERY LEVEL 4 BASE: Performed by: OPHTHALMOLOGY

## 2023-05-18 DEVICE — IMPLANTABLE DEVICE: Type: IMPLANTABLE DEVICE | Site: EYE | Status: FUNCTIONAL

## 2023-05-18 RX ORDER — SODIUM CHLORIDE 9 MG/ML
INJECTION, SOLUTION INTRAVENOUS PRN
Status: DISCONTINUED | OUTPATIENT
Start: 2023-05-18 | End: 2023-05-18 | Stop reason: HOSPADM

## 2023-05-18 RX ORDER — OFLOXACIN 3 MG/ML
SOLUTION/ DROPS OPHTHALMIC
Status: COMPLETED | OUTPATIENT
Start: 2023-05-18 | End: 2023-05-18

## 2023-05-18 RX ORDER — BALANCED SALT SOLUTION 6.4; .75; .48; .3; 3.9; 1.7 MG/ML; MG/ML; MG/ML; MG/ML; MG/ML; MG/ML
SOLUTION OPHTHALMIC
Status: COMPLETED | OUTPATIENT
Start: 2023-05-18 | End: 2023-05-18

## 2023-05-18 RX ORDER — LIDOCAINE HYDROCHLORIDE 10 MG/ML
INJECTION, SOLUTION EPIDURAL; INFILTRATION; INTRACAUDAL; PERINEURAL
Status: COMPLETED | OUTPATIENT
Start: 2023-05-18 | End: 2023-05-18

## 2023-05-18 RX ORDER — TETRACAINE HYDROCHLORIDE 5 MG/ML
1 SOLUTION OPHTHALMIC SEE ADMIN INSTRUCTIONS
Status: DISCONTINUED | OUTPATIENT
Start: 2023-05-18 | End: 2023-05-18 | Stop reason: HOSPADM

## 2023-05-18 RX ORDER — SODIUM CHLORIDE 9 MG/ML
INJECTION, SOLUTION INTRAVENOUS PRN
Status: DISCONTINUED | OUTPATIENT
Start: 2023-05-18 | End: 2023-05-18 | Stop reason: SDUPTHER

## 2023-05-18 RX ORDER — SODIUM CHLORIDE 0.9 % (FLUSH) 0.9 %
5-40 SYRINGE (ML) INJECTION EVERY 12 HOURS SCHEDULED
Status: DISCONTINUED | OUTPATIENT
Start: 2023-05-18 | End: 2023-05-18 | Stop reason: HOSPADM

## 2023-05-18 RX ORDER — KETOROLAC TROMETHAMINE 5 MG/ML
1 SOLUTION OPHTHALMIC SEE ADMIN INSTRUCTIONS
Status: COMPLETED | OUTPATIENT
Start: 2023-05-18 | End: 2023-05-18

## 2023-05-18 RX ORDER — TROPICAMIDE 10 MG/ML
1 SOLUTION/ DROPS OPHTHALMIC SEE ADMIN INSTRUCTIONS
Status: DISCONTINUED | OUTPATIENT
Start: 2023-05-18 | End: 2023-05-18 | Stop reason: HOSPADM

## 2023-05-18 RX ORDER — BRIMONIDINE TARTRATE 2 MG/ML
SOLUTION/ DROPS OPHTHALMIC
Status: COMPLETED | OUTPATIENT
Start: 2023-05-18 | End: 2023-05-18

## 2023-05-18 RX ORDER — SODIUM CHLORIDE 0.9 % (FLUSH) 0.9 %
5-40 SYRINGE (ML) INJECTION EVERY 12 HOURS SCHEDULED
Status: DISCONTINUED | OUTPATIENT
Start: 2023-05-18 | End: 2023-05-18 | Stop reason: SDUPTHER

## 2023-05-18 RX ORDER — SODIUM CHLORIDE 0.9 % (FLUSH) 0.9 %
5-40 SYRINGE (ML) INJECTION PRN
Status: DISCONTINUED | OUTPATIENT
Start: 2023-05-18 | End: 2023-05-18 | Stop reason: SDUPTHER

## 2023-05-18 RX ORDER — PHENYLEPHRINE HYDROCHLORIDE 100 MG/ML
1 SOLUTION/ DROPS OPHTHALMIC ONCE
Status: COMPLETED | OUTPATIENT
Start: 2023-05-18 | End: 2023-05-18

## 2023-05-18 RX ORDER — TETRACAINE HYDROCHLORIDE 5 MG/ML
SOLUTION OPHTHALMIC
Status: COMPLETED | OUTPATIENT
Start: 2023-05-18 | End: 2023-05-18

## 2023-05-18 RX ORDER — PHENYLEPHRINE HCL 2.5 %
1 DROPS OPHTHALMIC (EYE) SEE ADMIN INSTRUCTIONS
Status: DISCONTINUED | OUTPATIENT
Start: 2023-05-18 | End: 2023-05-18 | Stop reason: HOSPADM

## 2023-05-18 RX ORDER — CIPROFLOXACIN HYDROCHLORIDE 3.5 MG/ML
1 SOLUTION/ DROPS TOPICAL SEE ADMIN INSTRUCTIONS
Status: COMPLETED | OUTPATIENT
Start: 2023-05-18 | End: 2023-05-18

## 2023-05-18 RX ORDER — SODIUM CHLORIDE 0.9 % (FLUSH) 0.9 %
5-40 SYRINGE (ML) INJECTION PRN
Status: DISCONTINUED | OUTPATIENT
Start: 2023-05-18 | End: 2023-05-18 | Stop reason: HOSPADM

## 2023-05-18 RX ORDER — MIDAZOLAM HYDROCHLORIDE 1 MG/ML
INJECTION INTRAMUSCULAR; INTRAVENOUS PRN
Status: DISCONTINUED | OUTPATIENT
Start: 2023-05-18 | End: 2023-05-18 | Stop reason: SDUPTHER

## 2023-05-18 RX ADMIN — CIPROFLOXACIN 1 DROP: 3 SOLUTION OPHTHALMIC at 09:54

## 2023-05-18 RX ADMIN — TETRACAINE HYDROCHLORIDE 1 DROP: 5 SOLUTION OPHTHALMIC at 09:59

## 2023-05-18 RX ADMIN — MIDAZOLAM 0.5 MG: 1 INJECTION INTRAMUSCULAR; INTRAVENOUS at 11:29

## 2023-05-18 RX ADMIN — MIDAZOLAM 0.5 MG: 1 INJECTION INTRAMUSCULAR; INTRAVENOUS at 11:33

## 2023-05-18 RX ADMIN — PHENYLEPHRINE HYDROCHLORIDE 1 DROP: 100 SOLUTION/ DROPS OPHTHALMIC at 09:54

## 2023-05-18 RX ADMIN — TROPICAMIDE 1 DROP: 10 SOLUTION/ DROPS OPHTHALMIC at 09:54

## 2023-05-18 RX ADMIN — TETRACAINE HYDROCHLORIDE 1 DROP: 5 SOLUTION OPHTHALMIC at 09:54

## 2023-05-18 RX ADMIN — CIPROFLOXACIN 1 DROP: 3 SOLUTION OPHTHALMIC at 09:59

## 2023-05-18 RX ADMIN — POVIDONE-IODINE: 5 SOLUTION OPHTHALMIC at 09:59

## 2023-05-18 RX ADMIN — SODIUM CHLORIDE: 9 INJECTION, SOLUTION INTRAVENOUS at 10:02

## 2023-05-18 RX ADMIN — KETOROLAC TROMETHAMINE 1 DROP: 0.5 SOLUTION OPHTHALMIC at 09:54

## 2023-05-18 RX ADMIN — TROPICAMIDE 1 DROP: 10 SOLUTION/ DROPS OPHTHALMIC at 09:59

## 2023-05-18 ASSESSMENT — PAIN SCALES - GENERAL
PAINLEVEL_OUTOF10: 0
PAINLEVEL_OUTOF10: 0

## 2023-05-18 ASSESSMENT — PAIN - FUNCTIONAL ASSESSMENT: PAIN_FUNCTIONAL_ASSESSMENT: 0-10

## 2023-05-18 ASSESSMENT — LIFESTYLE VARIABLES: SMOKING_STATUS: 0

## 2023-05-18 NOTE — ANESTHESIA POSTPROCEDURE EVALUATION
Department of Anesthesiology  Postprocedure Note    Patient: Patrick Saunders  MRN: [id number]  YOB: 1935  Date of evaluation: 5/18/2023      Procedure Summary     Date: 05/18/23 Room / Location: [address]    Anesthesia Start: 1127 Anesthesia Stop: 1147    Procedure: PHACOEMULSIFICATION WITH INTRAOCULAR LENS IMPLANT - RIGHT EYE (Right: Eye) Diagnosis:       Combined forms of age-related cataract of right eye      (Combined forms of age-related cataract of right eye)    Surgeons: Elise June MD Responsible Provider: Kamaljit Servin MD    Anesthesia Type: MAC ASA Status: 3          Anesthesia Type: MAC    Kenney Phase I: Kenney Score: 10    Kenney Phase II: Kenney Score: 10      Anesthesia Post Evaluation    Patient location during evaluation: PACU  Patient participation: complete - patient participated  Level of consciousness: awake  Airway patency: patent  Nausea & Vomiting: no nausea and no vomiting  Complications: no  Cardiovascular status: hemodynamically stable and blood pressure returned to baseline  Respiratory status: spontaneous ventilation, nonlabored ventilation and room air  Hydration status: stable  Comments: Uneventful MAC anesthetic. Mr. Yanni Briceño was seen comfortably conversing with staff following procedure. Appropriate for discharge home with .

## 2023-05-18 NOTE — OP NOTE
Ramon Sneed    OPERATIVE NOTE    Preoperative Diagnosis: Cataract right eye    Postoperative Diagnosis: Cataract right eye    Procedure: Phacoemulsification with intraocular lens implantation, right eye  Surgeon: Lupe Mac MD    Anesthesia: MAC, topical.    Complications: none    Estimated blood loss: less than 1 ml. Specimens: none    Indications for procedure: The patient is a 80y.o. year old with decreased vision, glare and halos around lights, and trouble with activities of daily living. Examination revealed a visually significant cataract in the right eye. Risks, benefits, and alternatives to surgery were discussed with the patient and the patient elected to proceed with phacoemulsification with lens implantation. Details of the procedure: Following informed consent, the patient was taken to the operating room and placed in the supine position. Monitored anesthesia care was administered. The eye was prepped and draped in the usual sterile fashion using aseptic technique for cataract surgery. A side port incision was made. 1% preservative free lidocaine was injected through the side port incision for topical anesthesia. The eye was filled with viscoelastic and a 2.4 mm keratome blade was used to make a 3-plane clear corneal incision in the temporal cornea. The cystitome was used to make a tear in the anterior capsule and a Utrata forceps was used to make a complete curvilinear capsulorrhexis. The lens was hydrodissected and freely rotated. Phacoemulsification was performed. Irrigation/aspiration was used to remove all cortical material from the capsular bag. The eye was filled with viscoelastic and a foldable posterior chamber intraocular lens was injected into the capsular bag. The lens was rotated to the appropriate axis as needed. Irrigation/aspiration was used to remove all excess viscoelastic.   The eye was pressurized with BSS and the wounds were check for leaks and none were

## 2023-05-18 NOTE — DISCHARGE INSTRUCTIONS
Post-Operative Instructions After Cataract Surgery  [facility]   Abdiaziz Rizvi M.D.    [phone]    right      Patient Name: Shadi Thompson  : 1935  MRN: [id number]      Wear your protective shield at bedtime and nap time for 1 week to prevent accidentally rubbing or bumping your eye. The post-operative drops are VERY IMPORTANT. Use them as directed on the drop schedule given to you the day of surgery. Do not lift over 25 lbs for the first week. DO NOT RUB YOUR EYE. You may wash your hair 24 hours after surgery, but avoid getting water in your eye for the first 5 days. Use a dry wash cloth to protect water from getting in your eye while taking a shower. No eye makeup for 1 week. You may return to work anytime provided your job does not require heavy lifting or straining. If you work in a deonna environment, please take one week off work after surgery. CALL THE OFFICE IMMEDIATELY IF YOU EXPERIENCE ANY OF THE FOLLOWING                   [phone]  Veil or curtain coming across vision. Sudden decrease in vision. Shower of NEW floaters. Increase in pain. Flashes of light.

## 2023-05-18 NOTE — ANESTHESIA PRE PROCEDURE
Department of Anesthesiology  Preprocedure Note       Name:  Irwin Joyce   Age:  80 y.o.  :  1935                                          MRN:  [id number]         Date:  2023      Surgeon: Mahad Patterson):  Rebecca Haines MD    Procedure: Procedure(s):  PHACOEMULSIFICATION WITH INTRAOCULAR LENS IMPLANT - RIGHT EYE    Medications prior to admission:   Prior to Admission medications    Medication Sig Start Date End Date Taking? Authorizing Provider   betamethasone valerate (VALISONE) 0.1 % lotion Apply topically 2 times daily Have not taken in a week or two   Yes Historical Provider, MD   mirabegron (MYRBETRIQ) 25 MG TB24 Take 1 tablet by mouth daily   Yes Historical Provider, MD   cycloSPORINE (SANDIMMUNE) 100 MG capsule Take 1 capsule by mouth daily   Yes Historical Provider, MD   polyethylene glycol (MIRALAX) 17 g PACK packet Take 17 g by mouth daily as needed   Yes Historical Provider, MD   temazepam (RESTORIL) 15 MG capsule Take 1 capsule by mouth nightly as needed for Sleep. Yes Historical Provider, MD   Vitamin D, Ergocalciferol, 68098 units CAPS Take 50,000 Units by mouth once a week Takes on    Yes Historical Provider, MD   senna (SENOKOT) 8.6 MG tablet Take 1 tablet by mouth daily  Patient not taking: Reported on 2023   Yes Historical Provider, MD   gabapentin (NEURONTIN) 100 MG capsule Take 2 capsules by mouth daily.    Yes Historical Provider, MD   metoprolol succinate (TOPROL XL) 25 MG extended release tablet Take 1 tablet by mouth at bedtime   Yes Historical Provider, MD   rOPINIRole (REQUIP) 2 MG tablet Take 1 tablet by mouth in the morning and at bedtime   Yes Historical Provider, MD   Rosuvastatin Calcium (CRESTOR PO) Take 5 mg by mouth daily   Yes Historical Provider, MD   torsemide (DEMADEX) 10 MG tablet Take 1 tablet by mouth daily   Yes Historical Provider, MD   cyclophosphamide (CYTOXAN) 50 MG chemo tablet Take 50 mg by mouth daily  Patient not taking: Reported on

## 2023-05-30 NOTE — PERIOP NOTE
[address] [facility]  [phone]        Pre-Op Phone Call:     Patient Name: Carson Schaumann     Telephone Information:   Mobile [phone]     Home phone:  [phone]    Surgery Time:   10:50 AM     Arrival Time:  9:20     Left extended Message:  Yes     Message left with: Spoke with patient      Recent change in health status:  No     Advised of transportation/ policy:  Yes     NPO policy reviewed: Yes     Advised to take morning heart/blood pressure medications with sips of water morning of surgery? Yes     Instructed to bring eye drops, photo identification, and insurance card day of surgery? Yes     Advised to wear short sleeved button down shirt (no T-shirt underneath):  Yes     Advised not to wear jewelry, hairpins, or pantyhose day of surgery? Yes     Advised not to wear make-up and to wash face day of surgery?   Yes    Remarks: Spoke with patient         Electronically signed by:  Juan Fleming RN at 5/30/2023 9:53 AM

## 2023-05-31 ENCOUNTER — ANESTHESIA EVENT (OUTPATIENT)
Dept: SURGERY | Age: 88
End: 2023-05-31
Payer: MEDICARE

## 2023-05-31 RX ORDER — TROPICAMIDE 10 MG/ML
1 SOLUTION/ DROPS OPHTHALMIC SEE ADMIN INSTRUCTIONS
Status: CANCELLED | OUTPATIENT
Start: 2023-05-31

## 2023-05-31 RX ORDER — CIPROFLOXACIN HYDROCHLORIDE 3.5 MG/ML
1 SOLUTION/ DROPS TOPICAL SEE ADMIN INSTRUCTIONS
Status: CANCELLED | OUTPATIENT
Start: 2023-05-31

## 2023-05-31 RX ORDER — SODIUM CHLORIDE 0.9 % (FLUSH) 0.9 %
5-40 SYRINGE (ML) INJECTION EVERY 12 HOURS SCHEDULED
Status: CANCELLED | OUTPATIENT
Start: 2023-05-31

## 2023-05-31 RX ORDER — KETOROLAC TROMETHAMINE 5 MG/ML
1 SOLUTION OPHTHALMIC SEE ADMIN INSTRUCTIONS
Status: CANCELLED | OUTPATIENT
Start: 2023-05-31

## 2023-05-31 RX ORDER — SODIUM CHLORIDE 9 MG/ML
INJECTION, SOLUTION INTRAVENOUS PRN
Status: CANCELLED | OUTPATIENT
Start: 2023-05-31

## 2023-05-31 RX ORDER — TETRACAINE HYDROCHLORIDE 5 MG/ML
1 SOLUTION OPHTHALMIC SEE ADMIN INSTRUCTIONS
Status: CANCELLED | OUTPATIENT
Start: 2023-05-31

## 2023-05-31 RX ORDER — SODIUM CHLORIDE 0.9 % (FLUSH) 0.9 %
5-40 SYRINGE (ML) INJECTION PRN
Status: CANCELLED | OUTPATIENT
Start: 2023-05-31

## 2023-05-31 RX ORDER — PHENYLEPHRINE HCL 2.5 %
1 DROPS OPHTHALMIC (EYE) SEE ADMIN INSTRUCTIONS
Status: CANCELLED | OUTPATIENT
Start: 2023-05-31

## 2023-06-01 ENCOUNTER — ANESTHESIA (OUTPATIENT)
Dept: SURGERY | Age: 88
End: 2023-06-01
Payer: MEDICARE

## 2023-06-01 ENCOUNTER — HOSPITAL ENCOUNTER (OUTPATIENT)
Age: 88
Setting detail: OUTPATIENT SURGERY
Discharge: HOME | End: 2023-06-01
Attending: OPHTHALMOLOGY | Admitting: OPHTHALMOLOGY
Payer: MEDICARE

## 2023-06-01 VITALS
HEART RATE: 61 BPM | TEMPERATURE: 97.6 F | HEIGHT: 67 IN | WEIGHT: 157 LBS | OXYGEN SATURATION: 97 % | DIASTOLIC BLOOD PRESSURE: 65 MMHG | RESPIRATION RATE: 18 BRPM | SYSTOLIC BLOOD PRESSURE: 145 MMHG | BODY MASS INDEX: 24.64 KG/M2

## 2023-06-01 PROCEDURE — 3600000004 HC SURGERY LEVEL 4 BASE: Performed by: OPHTHALMOLOGY

## 2023-06-01 PROCEDURE — 2580000003 HC RX 258: Performed by: STUDENT IN AN ORGANIZED HEALTH CARE EDUCATION/TRAINING PROGRAM

## 2023-06-01 PROCEDURE — 6360000002 HC RX W HCPCS

## 2023-06-01 PROCEDURE — 2709999900 HC NON-CHARGEABLE SUPPLY: Performed by: OPHTHALMOLOGY

## 2023-06-01 PROCEDURE — 3700000000 HC ANESTHESIA ATTENDED CARE: Performed by: OPHTHALMOLOGY

## 2023-06-01 PROCEDURE — 6370000000 HC RX 637 (ALT 250 FOR IP): Performed by: OPHTHALMOLOGY

## 2023-06-01 PROCEDURE — 3600000014 HC SURGERY LEVEL 4 ADDTL 15MIN: Performed by: OPHTHALMOLOGY

## 2023-06-01 PROCEDURE — 2500000003 HC RX 250 WO HCPCS: Performed by: OPHTHALMOLOGY

## 2023-06-01 PROCEDURE — 6360000002 HC RX W HCPCS: Performed by: OPHTHALMOLOGY

## 2023-06-01 PROCEDURE — V2632 POST CHMBR INTRAOCULAR LENS: HCPCS | Performed by: OPHTHALMOLOGY

## 2023-06-01 PROCEDURE — 7100000010 HC PHASE II RECOVERY - FIRST 15 MIN: Performed by: OPHTHALMOLOGY

## 2023-06-01 PROCEDURE — 3700000001 HC ADD 15 MINUTES (ANESTHESIA): Performed by: OPHTHALMOLOGY

## 2023-06-01 DEVICE — LENS CLAREON IOL 25.5D: Type: IMPLANTABLE DEVICE | Site: EYE | Status: FUNCTIONAL

## 2023-06-01 RX ORDER — SODIUM CHLORIDE 0.9 % (FLUSH) 0.9 %
5-40 SYRINGE (ML) INJECTION PRN
Status: CANCELLED | OUTPATIENT
Start: 2023-06-01

## 2023-06-01 RX ORDER — PHENYLEPHRINE HCL 2.5 %
1 DROPS OPHTHALMIC (EYE) SEE ADMIN INSTRUCTIONS
Status: COMPLETED | OUTPATIENT
Start: 2023-06-01 | End: 2023-06-01

## 2023-06-01 RX ORDER — SODIUM CHLORIDE 9 MG/ML
INJECTION, SOLUTION INTRAVENOUS PRN
Status: CANCELLED | OUTPATIENT
Start: 2023-06-01

## 2023-06-01 RX ORDER — BALANCED SALT SOLUTION 6.4; .75; .48; .3; 3.9; 1.7 MG/ML; MG/ML; MG/ML; MG/ML; MG/ML; MG/ML
SOLUTION OPHTHALMIC
Status: COMPLETED | OUTPATIENT
Start: 2023-06-01 | End: 2023-06-01

## 2023-06-01 RX ORDER — SODIUM CHLORIDE 0.9 % (FLUSH) 0.9 %
5-40 SYRINGE (ML) INJECTION PRN
Status: DISCONTINUED | OUTPATIENT
Start: 2023-06-01 | End: 2023-06-01 | Stop reason: SDUPTHER

## 2023-06-01 RX ORDER — CIPROFLOXACIN HYDROCHLORIDE 3.5 MG/ML
1 SOLUTION/ DROPS TOPICAL SEE ADMIN INSTRUCTIONS
Status: DISCONTINUED | OUTPATIENT
Start: 2023-06-01 | End: 2023-06-01 | Stop reason: HOSPADM

## 2023-06-01 RX ORDER — TETRACAINE HYDROCHLORIDE 5 MG/ML
1 SOLUTION OPHTHALMIC SEE ADMIN INSTRUCTIONS
Status: DISCONTINUED | OUTPATIENT
Start: 2023-06-01 | End: 2023-06-01 | Stop reason: HOSPADM

## 2023-06-01 RX ORDER — SODIUM CHLORIDE 9 MG/ML
INJECTION, SOLUTION INTRAVENOUS PRN
Status: DISCONTINUED | OUTPATIENT
Start: 2023-06-01 | End: 2023-06-01 | Stop reason: HOSPADM

## 2023-06-01 RX ORDER — SODIUM CHLORIDE 0.9 % (FLUSH) 0.9 %
5-40 SYRINGE (ML) INJECTION EVERY 12 HOURS SCHEDULED
Status: DISCONTINUED | OUTPATIENT
Start: 2023-06-01 | End: 2023-06-01 | Stop reason: SDUPTHER

## 2023-06-01 RX ORDER — OFLOXACIN 3 MG/ML
SOLUTION/ DROPS OPHTHALMIC
Status: COMPLETED | OUTPATIENT
Start: 2023-06-01 | End: 2023-06-01

## 2023-06-01 RX ORDER — KETOROLAC TROMETHAMINE 5 MG/ML
1 SOLUTION OPHTHALMIC SEE ADMIN INSTRUCTIONS
Status: COMPLETED | OUTPATIENT
Start: 2023-06-01 | End: 2023-06-01

## 2023-06-01 RX ORDER — MIDAZOLAM HYDROCHLORIDE 1 MG/ML
INJECTION INTRAMUSCULAR; INTRAVENOUS PRN
Status: DISCONTINUED | OUTPATIENT
Start: 2023-06-01 | End: 2023-06-01 | Stop reason: SDUPTHER

## 2023-06-01 RX ORDER — TETRACAINE HYDROCHLORIDE 5 MG/ML
SOLUTION OPHTHALMIC
Status: COMPLETED | OUTPATIENT
Start: 2023-06-01 | End: 2023-06-01

## 2023-06-01 RX ORDER — SODIUM CHLORIDE 0.9 % (FLUSH) 0.9 %
5-40 SYRINGE (ML) INJECTION EVERY 12 HOURS SCHEDULED
Status: CANCELLED | OUTPATIENT
Start: 2023-06-01

## 2023-06-01 RX ORDER — ACETAMINOPHEN 325 MG/1
650 TABLET ORAL ONCE
Status: CANCELLED | OUTPATIENT
Start: 2023-06-01 | End: 2023-06-01

## 2023-06-01 RX ORDER — BRIMONIDINE TARTRATE 2 MG/ML
SOLUTION/ DROPS OPHTHALMIC
Status: COMPLETED | OUTPATIENT
Start: 2023-06-01 | End: 2023-06-01

## 2023-06-01 RX ORDER — SODIUM CHLORIDE 0.9 % (FLUSH) 0.9 %
5-40 SYRINGE (ML) INJECTION EVERY 12 HOURS SCHEDULED
Status: DISCONTINUED | OUTPATIENT
Start: 2023-06-01 | End: 2023-06-01 | Stop reason: HOSPADM

## 2023-06-01 RX ORDER — SODIUM CHLORIDE 9 MG/ML
INJECTION, SOLUTION INTRAVENOUS PRN
Status: DISCONTINUED | OUTPATIENT
Start: 2023-06-01 | End: 2023-06-01 | Stop reason: SDUPTHER

## 2023-06-01 RX ORDER — SODIUM CHLORIDE 0.9 % (FLUSH) 0.9 %
5-40 SYRINGE (ML) INJECTION PRN
Status: DISCONTINUED | OUTPATIENT
Start: 2023-06-01 | End: 2023-06-01 | Stop reason: HOSPADM

## 2023-06-01 RX ORDER — ONDANSETRON 2 MG/ML
4 INJECTION INTRAMUSCULAR; INTRAVENOUS
Status: CANCELLED | OUTPATIENT
Start: 2023-06-01 | End: 2023-06-02

## 2023-06-01 RX ORDER — TROPICAMIDE 10 MG/ML
1 SOLUTION/ DROPS OPHTHALMIC SEE ADMIN INSTRUCTIONS
Status: COMPLETED | OUTPATIENT
Start: 2023-06-01 | End: 2023-06-01

## 2023-06-01 RX ADMIN — TROPICAMIDE 1 DROP: 10 SOLUTION/ DROPS OPHTHALMIC at 09:30

## 2023-06-01 RX ADMIN — TETRACAINE HYDROCHLORIDE 1 DROP: 5 SOLUTION OPHTHALMIC at 09:30

## 2023-06-01 RX ADMIN — TROPICAMIDE 1 DROP: 10 SOLUTION/ DROPS OPHTHALMIC at 09:35

## 2023-06-01 RX ADMIN — KETOROLAC TROMETHAMINE 1 DROP: 0.5 SOLUTION OPHTHALMIC at 09:30

## 2023-06-01 RX ADMIN — PHENYLEPHRINE HYDROCHLORIDE 1 DROP: 25 SOLUTION/ DROPS OPHTHALMIC at 09:35

## 2023-06-01 RX ADMIN — MIDAZOLAM 1 MG: 1 INJECTION INTRAMUSCULAR; INTRAVENOUS at 10:41

## 2023-06-01 RX ADMIN — TROPICAMIDE 1 DROP: 10 SOLUTION/ DROPS OPHTHALMIC at 09:40

## 2023-06-01 RX ADMIN — MIDAZOLAM 1 MG: 1 INJECTION INTRAMUSCULAR; INTRAVENOUS at 10:35

## 2023-06-01 RX ADMIN — CIPROFLOXACIN 1 DROP: 3 SOLUTION OPHTHALMIC at 09:30

## 2023-06-01 RX ADMIN — SODIUM CHLORIDE: 9 INJECTION, SOLUTION INTRAVENOUS at 09:39

## 2023-06-01 RX ADMIN — PHENYLEPHRINE HYDROCHLORIDE 1 DROP: 25 SOLUTION/ DROPS OPHTHALMIC at 09:30

## 2023-06-01 RX ADMIN — PHENYLEPHRINE HYDROCHLORIDE 1 DROP: 25 SOLUTION/ DROPS OPHTHALMIC at 09:40

## 2023-06-01 RX ADMIN — POVIDONE-IODINE: 5 SOLUTION OPHTHALMIC at 09:40

## 2023-06-01 ASSESSMENT — PAIN SCALES - GENERAL
PAINLEVEL_OUTOF10: 0

## 2023-06-01 ASSESSMENT — LIFESTYLE VARIABLES: SMOKING_STATUS: 0

## 2023-06-01 NOTE — DISCHARGE INSTRUCTIONS
Post-Operative Instructions After Cataract Surgery  [facility]   Verna Sue M.D.    [phone]    left    2023    Patient Name: Mary Hope  : 1935  MRN: [id number]      Wear your protective shield at bedtime and nap time for 1 week to prevent accidentally rubbing or bumping your eye. The post-operative drops are VERY IMPORTANT. Use them as directed on the drop schedule given to you the day of surgery. Do not lift over 25 lbs for the first week. DO NOT RUB YOUR EYE. You may wash your hair 24 hours after surgery, but avoid getting water in your eye for the first 5 days. Use a dry wash cloth to protect water from getting in your eye while taking a shower. No eye makeup for 1 week. You may return to work anytime provided your job does not require heavy lifting or straining. If you work in a deonna environment, please take one week off work after surgery. CALL THE OFFICE IMMEDIATELY IF YOU EXPERIENCE ANY OF THE FOLLOWING                   [phone]  Veil or curtain coming across vision. Sudden decrease in vision. Shower of NEW floaters. Increase in pain. Flashes of light.

## 2023-06-01 NOTE — ANESTHESIA PRE PROCEDURE
Department of Anesthesiology  Preprocedure Note       Name:  Leobardo Acharya   Age:  80 y.o.  :  1935                                          MRN:  [id number]         Date:  2023      Surgeon: Nito Gaston):  Michael Cardona MD    Procedure: Procedure(s):  PHACOEMULSIFICATION WITH INTRAOCULAR LENS  IMPLANT - LEFT EYE    Medications prior to admission:   Prior to Admission medications    Medication Sig Start Date End Date Taking? Authorizing Provider   betamethasone valerate (VALISONE) 0.1 % lotion Apply topically 2 times daily Have not taken in a week or two    Historical Provider, MD   mirabegron (MYRBETRIQ) 25 MG TB24 Take 1 tablet by mouth daily    Historical Provider, MD   cycloSPORINE (SANDIMMUNE) 100 MG capsule Take 1 capsule by mouth daily    Historical Provider, MD   polyethylene glycol (MIRALAX) 17 g PACK packet Take 17 g by mouth daily as needed    Historical Provider, MD   temazepam (RESTORIL) 15 MG capsule Take 1 capsule by mouth nightly as needed for Sleep. Historical Provider, MD   Vitamin D, Ergocalciferol, 97484 units CAPS Take 50,000 Units by mouth once a week Takes on     Historical Provider, MD   senna (SENOKOT) 8.6 MG tablet Take 1 tablet by mouth daily  Patient not taking: Reported on 2023    Historical Provider, MD   gabapentin (NEURONTIN) 100 MG capsule Take 2 capsules by mouth daily.     Historical Provider, MD   metoprolol succinate (TOPROL XL) 25 MG extended release tablet Take 1 tablet by mouth at bedtime    Historical Provider, MD   rOPINIRole (REQUIP) 2 MG tablet Take 1 tablet by mouth in the morning and at bedtime    Historical Provider, MD   Rosuvastatin Calcium (CRESTOR PO) Take 5 mg by mouth daily    Historical Provider, MD   torsemide (DEMADEX) 10 MG tablet Take 1 tablet by mouth daily    Historical Provider, MD   cyclophosphamide (CYTOXAN) 50 MG chemo tablet Take 50 mg by mouth daily  Patient not taking: Reported on 2023    Historical Provider, MD

## 2023-06-01 NOTE — ANESTHESIA POSTPROCEDURE EVALUATION
Department of Anesthesiology  Postprocedure Note    Patient: Carson Schaumann  MRN: [id number]  YOB: 1935  Date of evaluation: 6/1/2023      Procedure Summary     Date: 06/01/23 Room / Location: [address]    Anesthesia Start: 1034 Anesthesia Stop: 1050    Procedure: PHACOEMULSIFICATION WITH INTRAOCULAR LENS  IMPLANT - LEFT EYE (Left: Eye) Diagnosis:       Combined forms of age-related cataract of left eye      (Combined forms of age-related cataract of left eye)    Surgeons: Jani Padilla MD Responsible Provider: Brenda Vogt MD    Anesthesia Type: MAC ASA Status: 3          Anesthesia Type: No value filed.     Kenney Phase I: Kenney Score: 10    Kenney Phase II: Kenney Score: 10      Anesthesia Post Evaluation    Patient location during evaluation: bedside  Patient participation: complete - patient participated  Level of consciousness: awake and alert  Pain score: 0  Airway patency: patent  Nausea & Vomiting: no vomiting  Complications: no  Cardiovascular status: blood pressure returned to baseline  Respiratory status: acceptable  Hydration status: euvolemic

## 2023-06-01 NOTE — OP NOTE
Ramon Sneed    OPERATIVE NOTE    Preoperative Diagnosis: Cataract left eye    Postoperative Diagnosis: Cataract left eye    Procedure: Phacoemulsification with intraocular lens implantation, left eye  Surgeon: Kiley uDgan MD    Anesthesia: MAC, topical.    Complications: none    Estimated blood loss: less than 1 ml. Specimens: none    Indications for procedure: The patient is a 80y.o. year old with decreased vision, glare and halos around lights, and trouble with activities of daily living. Examination revealed a visually significant cataract in the left eye. Risks, benefits, and alternatives to surgery were discussed with the patient and the patient elected to proceed with phacoemulsification with lens implantation. Details of the procedure: Following informed consent, the patient was taken to the operating room and placed in the supine position. Monitored anesthesia care was administered. The eye was prepped and draped in the usual sterile fashion using aseptic technique for cataract surgery. A side port incision was made. 1% preservative free lidocaine was injected through the side port incision for topical anesthesia. The eye was filled with viscoelastic and a 2.4 mm keratome blade was used to make a 3-plane clear corneal incision in the temporal cornea. The cystitome was used to make a tear in the anterior capsule and a Utrata forceps was used to make a complete curvilinear capsulorrhexis. The lens was hydrodissected and freely rotated. Phacoemulsification was performed. Irrigation/aspiration was used to remove all cortical material from the capsular bag. The eye was filled with viscoelastic and a foldable posterior chamber intraocular lens was injected into the capsular bag. The lens was rotated to the appropriate axis as needed. Irrigation/aspiration was used to remove all excess viscoelastic.   The eye was pressurized with BSS and the wounds were check for leaks and none were

## (undated) DEVICE — SYRINGE MED 30ML STD CLR PLAS LUERLOCK TIP N CTRL DISP

## (undated) DEVICE — Device

## (undated) DEVICE — SURGICAL PROC PACK SHT WEST V4

## (undated) DEVICE — SYRINGE TB 1ML NDL 25GA L0.625IN PLAS SLIP TIP CONVENTIONAL

## (undated) DEVICE — GLOVE SURG SZ 75 CRM LTX FREE POLYISOPRENE POLYMER BEAD ANTI

## (undated) DEVICE — Device: Brand: MEDEX

## (undated) DEVICE — SOLUTION IRRIG 500ML STRL H2O NONPYROGENIC

## (undated) DEVICE — SYRINGE MED 3ML CLR PLAS STD N CTRL LUERLOCK TIP DISP

## (undated) DEVICE — WIPE INSTR W73XL73CM VISITEC

## (undated) DEVICE — SOLUTION IRRIG BSS ST 500ML